# Patient Record
Sex: FEMALE | Race: WHITE | NOT HISPANIC OR LATINO | Employment: UNEMPLOYED | URBAN - METROPOLITAN AREA
[De-identification: names, ages, dates, MRNs, and addresses within clinical notes are randomized per-mention and may not be internally consistent; named-entity substitution may affect disease eponyms.]

---

## 2017-06-16 ENCOUNTER — DOCUMENTATION ONLY (OUTPATIENT)
Dept: RESEARCH | Facility: HOSPITAL | Age: 57
End: 2017-06-16

## 2017-06-16 NOTE — RESEARCH
Documentation of Informed Consent Obtained for Research by Non-[facility] Personnel    Study: PROmoting Successful Weight Loss in Primary CarE in [place] (PROP)  IRB Number: #[id number]; [facility] IRB ID: [id number]  : Mitch Bianchi, PhD.  Coordinating Site: [facility] Co-Investigators: Radha Hill MD and Andrea Carbone MD  [facility] Date: 11/6/15  This study is reviewed and acknowledged by the [facility] IRB. The IRB of Record is the [facility] ([facility]) IRB.    Is the volunteer currently enrolled in any other research trials (per Epic)? [ ] Yes  [X] No  [facility] staff administering informed consent: Rita Balderrama  Date:   5/22/2017  Does the volunteer agree to participate in the trial? [X] Yes  [ ] No  If no, document the reason here:       [X] I acknowledge that I have reviewed the informed consent form and the volunteer signed and dated the signature section of the consent forms.    Name: ([facility] personnel reviewing consent form):           Roman Miguel  Review and Scan Date (if different than date of note): 6/16/2017  Comments: See  for Consent Forms    [facility]-trained recruiters will obtain informed consent form all participants as well as HIPAA authorization.  Study protocol states all questions and concerns are clarified before any forms are signed. The following elements of the informed consent document were to be discussed:  · What you should know about a research study (e.g. purpose of the consent form; right to refuse or agree and change your mind later; participation is voluntary)?  · Who is doing the study?    · Where is the study being conducted?    · What is the purpose of this study?  · Who is eligible to participate in the study?     · What will happen to you if you take part in the study (e.g. Screening, Measurement visits, Lifestyle change meetings activities)?  · What are the  possible risks and discomforts? Benefits?  · If you do not want to take part in the study, are there other choices?  · If you have any questions or problems, whom can you call?  · What information will be kept private?  · Can your taking part in the study end early?  · What if information becomes available that might affect your decision to stay in the study?  · What charges will you have to pay? What payment will you receive?  · Will you be compensated for a study-related injury or medical illness?

## 2018-01-22 ENCOUNTER — HOSPITAL ENCOUNTER (OUTPATIENT)
Dept: RADIOLOGY | Facility: HOSPITAL | Age: 58
Discharge: HOME | End: 2018-01-22
Attending: INTERNAL MEDICINE
Payer: COMMERCIAL

## 2018-01-22 ENCOUNTER — OFFICE VISIT (OUTPATIENT)
Dept: INTERNAL MEDICINE | Facility: CLINIC | Age: 58
End: 2018-01-22
Payer: COMMERCIAL

## 2018-01-22 ENCOUNTER — IMMUNIZATION (OUTPATIENT)
Dept: INTERNAL MEDICINE | Facility: CLINIC | Age: 58
End: 2018-01-22
Payer: COMMERCIAL

## 2018-01-22 VITALS
DIASTOLIC BLOOD PRESSURE: 78 MMHG | WEIGHT: 225.06 LBS | SYSTOLIC BLOOD PRESSURE: 124 MMHG | BODY MASS INDEX: 36.17 KG/M2 | HEIGHT: 66 IN | HEART RATE: 86 BPM

## 2018-01-22 DIAGNOSIS — Z12.39 BREAST CANCER SCREENING: ICD-10-CM

## 2018-01-22 DIAGNOSIS — Z00.00 ROUTINE HISTORY AND PHYSICAL EXAMINATION OF ADULT: Primary | ICD-10-CM

## 2018-01-22 DIAGNOSIS — E66.9 OBESITY (BMI 35.0-39.9 WITHOUT COMORBIDITY): ICD-10-CM

## 2018-01-22 PROCEDURE — 77067 SCR MAMMO BI INCL CAD: CPT | Mod: TC

## 2018-01-22 PROCEDURE — 90471 IMMUNIZATION ADMIN: CPT | Mod: S$GLB,,, | Performed by: INTERNAL MEDICINE

## 2018-01-22 PROCEDURE — 90686 IIV4 VACC NO PRSV 0.5 ML IM: CPT | Mod: S$GLB,,, | Performed by: INTERNAL MEDICINE

## 2018-01-22 PROCEDURE — 99396 PREV VISIT EST AGE 40-64: CPT | Mod: 25,S$GLB,, | Performed by: INTERNAL MEDICINE

## 2018-01-22 PROCEDURE — 77067 SCR MAMMO BI INCL CAD: CPT | Mod: 26,,, | Performed by: RADIOLOGY

## 2018-01-22 PROCEDURE — 99999 PR PBB SHADOW E&M-EST. PATIENT-LVL III: CPT | Mod: PBBFAC,,, | Performed by: INTERNAL MEDICINE

## 2018-01-22 NOTE — PROGRESS NOTES
Subjective:       Patient ID: Annie Herrera is a 57 y.o. female.    Chief Complaint: Annual Exam    HPI - Feels well today.  She's not a smoker; drinks very rarely.  She's monogamous with her , but not very sexually active d/t his medical conditions.  She's due for mammogram and some bloodwork today.  Needs to lose weight - she actively asked for advice today. No illicit drug use.    PMH:  Obesity  Pre-diabetes  Elevated cholesterol, not on a statin yet     Meds:  Reviewed and reconciled in EPIC with patient during visit today.     Review of Systems   Constitutional: Negative for fever.   HENT: Negative for congestion.    Respiratory: Negative for shortness of breath.    Cardiovascular: Negative for chest pain.   Gastrointestinal: Negative for abdominal pain.   Genitourinary: Negative for difficulty urinating.   Musculoskeletal: Negative for arthralgias.   Skin: Negative for rash.   Neurological: Negative for headaches.   Psychiatric/Behavioral: Negative for sleep disturbance.       Objective:      Physical Exam   Constitutional: She is oriented to person, place, and time. She appears well-developed and well-nourished.   Friendly woman in NAD   HENT:   Head: Normocephalic and atraumatic.   Cardiovascular: Normal rate, regular rhythm and normal heart sounds.  Exam reveals no gallop and no friction rub.    No murmur heard.  Pulmonary/Chest: Effort normal and breath sounds normal. No respiratory distress. She has no wheezes. She has no rales. She exhibits no tenderness.   Neurological: She is alert and oriented to person, place, and time.   Skin: Skin is warm and dry. No erythema.   Psychiatric: She has a normal mood and affect.   Nursing note and vitals reviewed.      Assessment:       1. Routine history and physical examination of adult    2. Breast cancer screening    3. Obesity (BMI 35.0-39.9 without comorbidity)        Plan:       Annie was seen today for annual exam.    Diagnoses and all orders for this  visit:    Routine history and physical examination of adult - healthy woman.  No medical issues o/t weight.  Updating labwork  -     Comprehensive metabolic panel; Future  -     Lipid panel; Future  -     Hemoglobin A1c; Future  -     TSH; Future    Breast cancer screening  -     Mammo Digital Screening Bilat with CAD; Future    Obesity (BMI 35.0-39.9 without comorbidity) - discussed options, including weight watchers and continuing/increasing exercise.  Will refer to medical fitness for their assessment and assistance.  -     Ambulatory Referral to Medical Fitness (MEDFIT)  -     OHMEGAN SMITH ASSIGN QUESTIONNAIRE SERIES (MEDFIT)  -     [facility] Patient Entered [facility] (REGISTRAT-MAPI)    rtc one year.    LUNA Paige MD MPH  Staff Internist  '

## 2018-03-01 ENCOUNTER — CLINICAL SUPPORT (OUTPATIENT)
Dept: INTERNAL MEDICINE | Facility: CLINIC | Age: 58
End: 2018-03-01
Payer: COMMERCIAL

## 2018-03-01 VITALS — WEIGHT: 219.56 LBS | BODY MASS INDEX: 35.44 KG/M2

## 2018-03-01 NOTE — PROGRESS NOTES
Date:   3/1/18                   Time:0800  Initial Health  Contact - [x]Clinic visit  []  Phone Visit  ASSEMENT: Information obtained through combination of chart review prior to seeing patient, patient self-report.   Primary Referral diagnosis/reason for referral:   Weight loss        (See physician progress note for complete list of diagnoses.)  PCP:    Dr. Paige    Referent :  [] PCP       [] Transition Navigator    []  E.JULIETTE    []  APRN  [x]  Other:     Supports:   Daughters, husbands       Preferred Learning Style  (may be a combination)  [x]  Visual      [] Auditory     [x]  Reading    [x]  Hands-on    []  1:1    []  Group        []  Alone       []  No preference   [x]  Combination  []  Other:         Presenting issues from patients point of view:  [x]  Improve nutrition/increase healthy choices.                    [x]  Improve /maintain current functioning.  []  Obtain and maintain healthy blood pressure.                  []  Stop smoking.  [x]  Improve weight management.                                       []  Lower cholesterol.  []  Improve blood glucose management.                            []  Improve breathing.  []  Increase energy level.                                                      []  Increase/maintain independence.   []  Improve sleep.                                                                []  Decrease stress.  []  Improve pain management.                                             []  Improve mood.  []  Other:                  Pt. Education Level:     BS in accounting     Disease specific education services attended:  no        Patient Employed:  []yes    [x] No  Where _____________________  Days and Hours:                Current Self-help Behaviors  []  Checks glucose level        times a day.  [x]  Tries to modify meals so more healthy.  [x]  Exercises by   Walking 3-4 days a week for 2 miles, started weight training at [employer] 6 weeks ago     []  Takes BP         times a       (insert frequency)  []  Weighs self         (how often)  []  Takes all medications as prescribed.  [x]  Rarely misses health care appointments.  []  Sleeps 8 hours without waking more than twice.  []  Consistently wears/uses functioning aids as recommended  (ie:  Contacts [] , glasses [x] , hearing  Aid [] , prosthesis [] ,  Walker [] ,  Wheelchair []  etc.)  [x]  Regularly engages in stress management activities I.e.: Prayer  []  Quit smoking   []  Purposefully educates self about health issues.  []  Pt did bring glucose log with him/her.  []  Other  (explain)           []  Comments:       []  Reported Blood Sugar Readings:          Health screenings   Last PCP visit:   18   GYN/Pap:    Hysterectomy                         MM-22-18                                  DEXA:   16                     Colon: 13   Lipids: 18   CMP:18   HgA1C: 18             Eye Exam:    Dr. Moraima Amado         Strengths:  [x]  Confident                       [x]  Determined/persistent           [x]  Enthusiastic         [x]  Good problem solving           []  Good self-control                   [x]  Hard working        [x]  Hopeful/optimistic                  [x]  Intelligent                                [x]  Self aware  [x]  Creative                                 [x]  Flexible          []  Sense of humor                     [x]  Open/willing          [x]  Spiritual                                  [x] Values health    []  Successful overcoming difficult challenges in the past.     [x]  Pos support network  [x]   Health literate (The degree to which individuals have the capacity to obtain, process, and    understand basic health information and services needed to make appropriate health decisions.- Dept. of Health and Human services.)  []   Other Comments:             Change Markers  Scale 0-10 with 10 representing most Ready 0 least ready, 10 most important 0  least important 10 most confident 0 least confident.     [] NA at this time.   Readiness: 10    ;  Importance:    10 ;  Confidence: 10       INTERVENTIONS:  [x] Given an explanation about health coaching program and invited participation.  [x] Listened reflectively; provided support, encouragement, and validation; respected  and maintained patient self-direction; explored pros/cons of change; personalized health risks of maintaining the status quo; and facilitated recognition of discrepancy between current behaviors and patients goals and values.  [x] Assessed stage of change and employed appropriate motivational interviewing strategies to facilitate movement toward the next stage of change and healthy behavior modification.  [x] Normalized occurrence of relapse, helped patient recognize outcome of new self-learning as a result of the relapse such as triggers, and helped focus on factors to reduce chances of returning to previous behaviors .  [x] Used readiness scale ratings to guide assessment of importance and confidence of successfully reaching goals.  [x] Assisted patient to develop goal, action plan, and address potential barriers to goal     achievement.  [] Patient was given a new (insert glucose meter or other supplies), taught to use, and      successfully demonstrated ability to carry out essential steps of process.    [] Rx for ( monitor/supplies, pen needles, etc.) was obtained.  [x] Provided educational review, written materials/handouts (Meal Planning Counting Carbs, Healthy Plate, 10 Rules for Eating Safely for Life, 10 Tips to Cutting Your Cravings, Probiotics).   [x] Topics discussed/provided:    GI of foods and how it affects your metabolism and helps you burn fat, carb counting    [x] Facilitated completion of needed exams and screenings by reviewing Diabetic/Health Maintenance Report Card with patient.  [x] Provided pt with my business card.  [x] Informed of/reviewed how to access health   and after hours assistance.  [x] Discussed, planned, and scheduled future contacts.  [x]Challenges/Barriers identified discussed as identified by patient.  [x] Needs identified by this Health :    Education and support     [] Other:            For additional care management support patient was referred to:        []  Community resources for                        []  Medication assistance programs               []  Dietician              []  Diabetes  [employer]                                        []  Mental health services                           []                  []  Diabetes [place]                                              []  Home health services                                []  Complex case management              []  PCP/covering provider due to                 []  Emergency Dept.                                  []  GYN              []  Optometrist/ Ophthalmologist                          []  Podiatrist                                                      [x]  N/A        []  Other:           RESPONSE/IMPRESSION  Behavior is consistent with the following stage of change:  []  Pre-contemplation  []  Contemplation  [x]  Preparation  []  Action  []  Maintenance  []  Relapse    Level of participation:  []  Refused to participate  []  Guarded / resistant  []  Passive participant  [x]  Active participant/interactive  [x]  Interested/receptive  [x]  Self-motivated  []  Other          Goal developed by patient today:    Evaluate current meal plan and prep 10/10  Read labels to see which foods are hurting us 10/10  Examin pantry and purge foods that are not good (sugar, carbs, salt)10/10  Set meal plan prior to shopping, shop fresh and frozen 10/10  Handle meals on weekly basis to eliminate wasting foods 10/10  Move more 10/10  Eliminate soft drinks diet 10/10  Begin today ! 10/10    Plan (needed actions to accomplish goal):          [x] Pt will work on action plan as stated above.         [x] Pt will follow up with Health  on   4/4/18 at 730.     [x] Health  will remain available.  [x] Health  will maintain regular contacts with patient to educate, support, encourage; help problem-solve; assist with development of self-advocacy/increasing independent health management; and provide resources as needed.  [] Pt has declined Health  Program at this time. Provided pt with Health  Program information should they decide to participate at a later time.        [] Pt to facilitate contact with Health        [] Health  to facilitate contact with patient.        [] Other:          Notes:   Met with patient and her . She is interested in losing weight to get down to 170 lbs.   Her best reasons are to maintain health, not to lose mobility and to feel better about herself.   She has started going to [employer] about 6 weeks ago and has lost 6 lbs since then.   She is also participating in the [employer] program.  Goals set by patient and will continue to assist to reach her goals.    Best Method of Contact:  [x] email:   [x] Phone:   [] Mail  [] Office     Grisel Rainey RN

## 2021-02-24 ENCOUNTER — TELEPHONE (OUTPATIENT)
Dept: INTERNAL MEDICINE | Facility: CLINIC | Age: 61
End: 2021-02-24

## 2021-02-24 DIAGNOSIS — Z12.31 ENCOUNTER FOR SCREENING MAMMOGRAM FOR MALIGNANT NEOPLASM OF BREAST: Primary | ICD-10-CM

## 2021-03-02 ENCOUNTER — HOSPITAL ENCOUNTER (OUTPATIENT)
Dept: RADIOLOGY | Facility: HOSPITAL | Age: 61
Discharge: HOME | End: 2021-03-02
Attending: INTERNAL MEDICINE
Payer: COMMERCIAL

## 2021-03-02 ENCOUNTER — OFFICE VISIT (OUTPATIENT)
Dept: INTERNAL MEDICINE | Facility: CLINIC | Age: 61
End: 2021-03-02
Payer: COMMERCIAL

## 2021-03-02 VITALS
DIASTOLIC BLOOD PRESSURE: 70 MMHG | OXYGEN SATURATION: 98 % | WEIGHT: 230.81 LBS | HEART RATE: 82 BPM | HEIGHT: 66 IN | SYSTOLIC BLOOD PRESSURE: 130 MMHG | BODY MASS INDEX: 37.09 KG/M2

## 2021-03-02 DIAGNOSIS — Z12.31 ENCOUNTER FOR SCREENING MAMMOGRAM FOR MALIGNANT NEOPLASM OF BREAST: ICD-10-CM

## 2021-03-02 DIAGNOSIS — Z00.00 ROUTINE HISTORY AND PHYSICAL EXAMINATION OF ADULT: Primary | ICD-10-CM

## 2021-03-02 PROCEDURE — 77063 MAMMO DIGITAL SCREENING BILAT WITH TOMO: ICD-10-PCS | Mod: 26,,, | Performed by: RADIOLOGY

## 2021-03-02 PROCEDURE — 77067 MAMMO DIGITAL SCREENING BILAT WITH TOMO: ICD-10-PCS | Mod: 26,,, | Performed by: RADIOLOGY

## 2021-03-02 PROCEDURE — 99386 PREV VISIT NEW AGE 40-64: CPT | Mod: S$PBB,,, | Performed by: INTERNAL MEDICINE

## 2021-03-02 PROCEDURE — 99999 PR PBB SHADOW E&M-EST. PATIENT-LVL III: CPT | Mod: PBBFAC,,, | Performed by: INTERNAL MEDICINE

## 2021-03-02 PROCEDURE — 77063 BREAST TOMOSYNTHESIS BI: CPT | Mod: 26,,, | Performed by: RADIOLOGY

## 2021-03-02 PROCEDURE — 99386 PR PREVENTIVE VISIT,NEW,40-64: ICD-10-PCS | Mod: S$PBB,,, | Performed by: INTERNAL MEDICINE

## 2021-03-02 PROCEDURE — 77067 SCR MAMMO BI INCL CAD: CPT | Mod: TC

## 2021-03-02 PROCEDURE — 77067 SCR MAMMO BI INCL CAD: CPT | Mod: 26,,, | Performed by: RADIOLOGY

## 2021-03-02 PROCEDURE — 99999 PR PBB SHADOW E&M-EST. PATIENT-LVL III: ICD-10-PCS | Mod: PBBFAC,,, | Performed by: INTERNAL MEDICINE

## 2021-03-03 ENCOUNTER — TELEPHONE (OUTPATIENT)
Dept: RADIOLOGY | Facility: HOSPITAL | Age: 61
End: 2021-03-03

## 2021-03-04 ENCOUNTER — HOSPITAL ENCOUNTER (OUTPATIENT)
Dept: RADIOLOGY | Facility: HOSPITAL | Age: 61
Discharge: HOME | End: 2021-03-04
Attending: INTERNAL MEDICINE

## 2021-03-04 ENCOUNTER — HOSPITAL ENCOUNTER (OUTPATIENT)
Dept: RADIOLOGY | Facility: HOSPITAL | Age: 61
Discharge: HOME | End: 2021-03-04
Attending: INTERNAL MEDICINE
Payer: COMMERCIAL

## 2021-03-04 DIAGNOSIS — R92.8 ABNORMAL MAMMOGRAM: ICD-10-CM

## 2021-03-04 PROCEDURE — 77065 MAMMO DIGITAL DIAGNOSTIC LEFT WITH TOMO: ICD-10-PCS | Mod: 26,LT,, | Performed by: RADIOLOGY

## 2021-03-04 PROCEDURE — 76642 ULTRASOUND BREAST LIMITED: CPT | Mod: TC,LT

## 2021-03-04 PROCEDURE — 77065 DX MAMMO INCL CAD UNI: CPT | Mod: 26,LT,, | Performed by: RADIOLOGY

## 2021-03-04 PROCEDURE — 77061 BREAST TOMOSYNTHESIS UNI: CPT | Mod: 26,LT,, | Performed by: RADIOLOGY

## 2021-03-04 PROCEDURE — 77061 MAMMO DIGITAL DIAGNOSTIC LEFT WITH TOMO: ICD-10-PCS | Mod: 26,LT,, | Performed by: RADIOLOGY

## 2021-03-04 PROCEDURE — 77061 BREAST TOMOSYNTHESIS UNI: CPT | Mod: TC,LT

## 2021-03-04 PROCEDURE — 76642 US BREAST LEFT LIMITED: ICD-10-PCS | Mod: 26,LT,, | Performed by: RADIOLOGY

## 2021-03-04 PROCEDURE — 76642 ULTRASOUND BREAST LIMITED: CPT | Mod: 26,LT,, | Performed by: RADIOLOGY

## 2021-03-06 ENCOUNTER — IMMUNIZATION (OUTPATIENT)
Dept: PRIMARY CARE CLINIC | Facility: CLINIC | Age: 61
End: 2021-03-06
Payer: COMMERCIAL

## 2021-03-06 DIAGNOSIS — Z23 NEED FOR VACCINATION: Primary | ICD-10-CM

## 2021-03-06 PROCEDURE — 91300 PR SARS-COV- 2 COVID-19 VACCINE, NO PRSV, 30MCG/0.3ML, IM: CPT | Mod: S$GLB,,, | Performed by: EMERGENCY MEDICINE

## 2021-03-06 PROCEDURE — 0001A PR IMMUNIZ ADMIN, SARS-COV-2 COVID-19 VACC, 30MCG/0.3ML, 1ST DOSE: ICD-10-PCS | Mod: CV19,S$GLB,, | Performed by: EMERGENCY MEDICINE

## 2021-03-06 PROCEDURE — 0001A PR IMMUNIZ ADMIN, SARS-COV-2 COVID-19 VACC, 30MCG/0.3ML, 1ST DOSE: CPT | Mod: CV19,S$GLB,, | Performed by: EMERGENCY MEDICINE

## 2021-03-06 PROCEDURE — 91300 PR SARS-COV- 2 COVID-19 VACCINE, NO PRSV, 30MCG/0.3ML, IM: ICD-10-PCS | Mod: S$GLB,,, | Performed by: EMERGENCY MEDICINE

## 2021-03-06 RX ADMIN — Medication 0.3 ML: at 12:03

## 2021-03-27 ENCOUNTER — IMMUNIZATION (OUTPATIENT)
Dept: PRIMARY CARE CLINIC | Facility: CLINIC | Age: 61
End: 2021-03-27

## 2021-03-27 DIAGNOSIS — Z23 NEED FOR VACCINATION: Primary | ICD-10-CM

## 2021-03-27 PROCEDURE — 91300 PR SARS-COV- 2 COVID-19 VACCINE, NO PRSV, 30MCG/0.3ML, IM: CPT | Mod: S$GLB,,, | Performed by: INTERNAL MEDICINE

## 2021-03-27 PROCEDURE — 0002A PR IMMUNIZ ADMIN, SARS-COV-2 COVID-19 VACC, 30MCG/0.3ML, 2ND DOSE: ICD-10-PCS | Mod: CV19,S$GLB,, | Performed by: INTERNAL MEDICINE

## 2021-03-27 PROCEDURE — 0002A PR IMMUNIZ ADMIN, SARS-COV-2 COVID-19 VACC, 30MCG/0.3ML, 2ND DOSE: CPT | Mod: CV19,S$GLB,, | Performed by: INTERNAL MEDICINE

## 2021-03-27 PROCEDURE — 91300 PR SARS-COV- 2 COVID-19 VACCINE, NO PRSV, 30MCG/0.3ML, IM: ICD-10-PCS | Mod: S$GLB,,, | Performed by: INTERNAL MEDICINE

## 2021-03-27 RX ADMIN — Medication 0.3 ML: at 01:03

## 2021-09-16 ENCOUNTER — HOSPITAL ENCOUNTER (OUTPATIENT)
Dept: RADIOLOGY | Facility: HOSPITAL | Age: 61
Discharge: HOME | End: 2021-09-16
Attending: INTERNAL MEDICINE
Payer: COMMERCIAL

## 2021-09-16 DIAGNOSIS — R92.8 ABNORMAL MAMMOGRAM: ICD-10-CM

## 2021-09-16 PROCEDURE — 77061 MAMMO DIGITAL DIAGNOSTIC LEFT WITH TOMO: ICD-10-PCS | Mod: 26,LT,, | Performed by: RADIOLOGY

## 2021-09-16 PROCEDURE — 77065 MAMMO DIGITAL DIAGNOSTIC LEFT WITH TOMO: ICD-10-PCS | Mod: 26,LT,, | Performed by: RADIOLOGY

## 2021-09-16 PROCEDURE — 77061 BREAST TOMOSYNTHESIS UNI: CPT | Mod: TC,LT

## 2021-09-16 PROCEDURE — 77061 BREAST TOMOSYNTHESIS UNI: CPT | Mod: 26,LT,, | Performed by: RADIOLOGY

## 2021-09-16 PROCEDURE — 77065 DX MAMMO INCL CAD UNI: CPT | Mod: 26,LT,, | Performed by: RADIOLOGY

## 2021-10-11 ENCOUNTER — IMMUNIZATION (OUTPATIENT)
Dept: INTERNAL MEDICINE | Facility: CLINIC | Age: 61
End: 2021-10-11
Payer: COMMERCIAL

## 2021-10-11 PROCEDURE — 90471 FLU VACCINE (QUAD) GREATER THAN OR EQUAL TO 3YO PRESERVATIVE FREE IM: ICD-10-PCS | Mod: S$GLB,,, | Performed by: INTERNAL MEDICINE

## 2021-10-11 PROCEDURE — 90471 IMMUNIZATION ADMIN: CPT | Mod: S$GLB,,, | Performed by: INTERNAL MEDICINE

## 2021-10-11 PROCEDURE — 90686 FLU VACCINE (QUAD) GREATER THAN OR EQUAL TO 3YO PRESERVATIVE FREE IM: ICD-10-PCS | Mod: S$GLB,,, | Performed by: INTERNAL MEDICINE

## 2021-10-11 PROCEDURE — 90686 IIV4 VACC NO PRSV 0.5 ML IM: CPT | Mod: S$GLB,,, | Performed by: INTERNAL MEDICINE

## 2022-01-05 ENCOUNTER — LAB VISIT (OUTPATIENT)
Dept: PRIMARY CARE CLINIC | Facility: CLINIC | Age: 62
End: 2022-01-05
Payer: COMMERCIAL

## 2022-01-05 DIAGNOSIS — Z20.822 CONTACT WITH AND (SUSPECTED) EXPOSURE TO COVID-19: ICD-10-CM

## 2022-01-05 LAB
CTP QC/QA: YES
SARS-COV-2 AG RESP QL IA.RAPID: POSITIVE

## 2022-01-05 PROCEDURE — 87811 SARS-COV-2 COVID19 W/OPTIC: CPT

## 2022-01-19 ENCOUNTER — IMMUNIZATION (OUTPATIENT)
Dept: INTERNAL MEDICINE | Facility: CLINIC | Age: 62
End: 2022-01-19
Payer: COMMERCIAL

## 2022-01-19 DIAGNOSIS — Z23 NEED FOR VACCINATION: Primary | ICD-10-CM

## 2022-01-19 PROCEDURE — 0004A COVID-19, MRNA, LNP-S, PF, 30 MCG/0.3 ML DOSE VACCINE: CPT | Mod: CV19,PBBFAC | Performed by: INTERNAL MEDICINE

## 2022-03-16 ENCOUNTER — PATIENT MESSAGE (OUTPATIENT)
Dept: ADMINISTRATIVE | Facility: HOSPITAL | Age: 62
End: 2022-03-16
Payer: COMMERCIAL

## 2022-03-22 ENCOUNTER — TELEPHONE (OUTPATIENT)
Dept: INTERNAL MEDICINE | Facility: CLINIC | Age: 62
End: 2022-03-22
Payer: COMMERCIAL

## 2022-03-22 DIAGNOSIS — Z12.31 ENCOUNTER FOR SCREENING MAMMOGRAM FOR MALIGNANT NEOPLASM OF BREAST: Primary | ICD-10-CM

## 2022-03-22 NOTE — TELEPHONE ENCOUNTER
----- Message from Ximena Manning sent at 3/22/2022  2:26 PM CDT -----  Contact: [phone]  Pt needs her 6 month mammo orders put in and would like a call once orders are place so she can call and schedule.    [phone]

## 2022-04-18 ENCOUNTER — HOSPITAL ENCOUNTER (OUTPATIENT)
Dept: RADIOLOGY | Facility: HOSPITAL | Age: 62
Discharge: HOME | End: 2022-04-18
Attending: INTERNAL MEDICINE
Payer: COMMERCIAL

## 2022-04-18 VITALS — WEIGHT: 210 LBS | HEIGHT: 66 IN | BODY MASS INDEX: 33.75 KG/M2

## 2022-04-18 DIAGNOSIS — Z12.31 ENCOUNTER FOR SCREENING MAMMOGRAM FOR MALIGNANT NEOPLASM OF BREAST: ICD-10-CM

## 2022-04-18 PROCEDURE — 77063 BREAST TOMOSYNTHESIS BI: CPT | Mod: TC

## 2022-04-18 PROCEDURE — 77063 MAMMO DIGITAL SCREENING BILAT WITH TOMO: ICD-10-PCS | Mod: 26,,, | Performed by: RADIOLOGY

## 2022-04-18 PROCEDURE — 77067 MAMMO DIGITAL SCREENING BILAT WITH TOMO: ICD-10-PCS | Mod: 26,,, | Performed by: RADIOLOGY

## 2022-04-18 PROCEDURE — 77063 BREAST TOMOSYNTHESIS BI: CPT | Mod: 26,,, | Performed by: RADIOLOGY

## 2022-04-18 PROCEDURE — 77067 SCR MAMMO BI INCL CAD: CPT | Mod: 26,,, | Performed by: RADIOLOGY

## 2022-04-29 ENCOUNTER — OFFICE VISIT (OUTPATIENT)
Dept: INTERNAL MEDICINE | Facility: CLINIC | Age: 62
End: 2022-04-29
Payer: COMMERCIAL

## 2022-04-29 ENCOUNTER — LAB VISIT (OUTPATIENT)
Dept: LAB | Facility: HOSPITAL | Age: 62
End: 2022-04-29
Attending: INTERNAL MEDICINE
Payer: COMMERCIAL

## 2022-04-29 VITALS
DIASTOLIC BLOOD PRESSURE: 80 MMHG | SYSTOLIC BLOOD PRESSURE: 124 MMHG | HEIGHT: 66 IN | BODY MASS INDEX: 36.24 KG/M2 | HEART RATE: 82 BPM | WEIGHT: 225.5 LBS | OXYGEN SATURATION: 98 %

## 2022-04-29 DIAGNOSIS — Z00.00 ROUTINE HISTORY AND PHYSICAL EXAMINATION OF ADULT: Primary | ICD-10-CM

## 2022-04-29 DIAGNOSIS — Z86.19 HISTORY OF SHINGLES: ICD-10-CM

## 2022-04-29 DIAGNOSIS — Z00.00 ROUTINE HISTORY AND PHYSICAL EXAMINATION OF ADULT: ICD-10-CM

## 2022-04-29 DIAGNOSIS — R00.2 PALPITATIONS: ICD-10-CM

## 2022-04-29 LAB
ALBUMIN SERPL BCP-MCNC: 3.7 G/DL (ref 3.5–5.2)
ALP SERPL-CCNC: 77 U/L (ref 55–135)
ALT SERPL W/O P-5'-P-CCNC: 14 U/L (ref 10–44)
ANION GAP SERPL CALC-SCNC: 10 MMOL/L (ref 8–16)
AST SERPL-CCNC: 17 U/L (ref 10–40)
BILIRUB SERPL-MCNC: 0.4 MG/DL (ref 0.1–1)
BUN SERPL-MCNC: 10 MG/DL (ref 8–23)
CALCIUM SERPL-MCNC: 9.8 MG/DL (ref 8.7–10.5)
CHLORIDE SERPL-SCNC: 107 MMOL/L (ref 95–110)
CHOLEST SERPL-MCNC: 222 MG/DL (ref 120–199)
CHOLEST/HDLC SERPL: 4.5 {RATIO} (ref 2–5)
CO2 SERPL-SCNC: 25 MMOL/L (ref 23–29)
CREAT SERPL-MCNC: 0.7 MG/DL (ref 0.5–1.4)
EST. GFR  (AFRICAN AMERICAN): >60 ML/MIN/1.73 M^2
EST. GFR  (NON AFRICAN AMERICAN): >60 ML/MIN/1.73 M^2
ESTIMATED AVG GLUCOSE: 117 MG/DL (ref 68–131)
GLUCOSE SERPL-MCNC: 90 MG/DL (ref 70–110)
HBA1C MFR BLD: 5.7 % (ref 4–5.6)
HDLC SERPL-MCNC: 49 MG/DL (ref 40–75)
HDLC SERPL: 22.1 % (ref 20–50)
LDLC SERPL CALC-MCNC: 144.8 MG/DL (ref 63–159)
NONHDLC SERPL-MCNC: 173 MG/DL
POTASSIUM SERPL-SCNC: 4.1 MMOL/L (ref 3.5–5.1)
PROT SERPL-MCNC: 6.9 G/DL (ref 6–8.4)
SODIUM SERPL-SCNC: 142 MMOL/L (ref 136–145)
TRIGL SERPL-MCNC: 141 MG/DL (ref 30–150)

## 2022-04-29 PROCEDURE — 36415 COLL VENOUS BLD VENIPUNCTURE: CPT | Performed by: INTERNAL MEDICINE

## 2022-04-29 PROCEDURE — 3008F BODY MASS INDEX DOCD: CPT | Mod: CPTII,S$GLB,, | Performed by: INTERNAL MEDICINE

## 2022-04-29 PROCEDURE — 80053 COMPREHEN METABOLIC PANEL: CPT | Performed by: INTERNAL MEDICINE

## 2022-04-29 PROCEDURE — 3079F DIAST BP 80-89 MM HG: CPT | Mod: CPTII,S$GLB,, | Performed by: INTERNAL MEDICINE

## 2022-04-29 PROCEDURE — 3074F PR MOST RECENT SYSTOLIC BLOOD PRESSURE < 130 MM HG: ICD-10-PCS | Mod: CPTII,S$GLB,, | Performed by: INTERNAL MEDICINE

## 2022-04-29 PROCEDURE — 80061 LIPID PANEL: CPT | Performed by: INTERNAL MEDICINE

## 2022-04-29 PROCEDURE — 1159F PR MEDICATION LIST DOCUMENTED IN MEDICAL RECORD: ICD-10-PCS | Mod: CPTII,S$GLB,, | Performed by: INTERNAL MEDICINE

## 2022-04-29 PROCEDURE — 1160F PR REVIEW ALL MEDS BY PRESCRIBER/CLIN PHARMACIST DOCUMENTED: ICD-10-PCS | Mod: CPTII,S$GLB,, | Performed by: INTERNAL MEDICINE

## 2022-04-29 PROCEDURE — 99999 PR PBB SHADOW E&M-EST. PATIENT-LVL III: ICD-10-PCS | Mod: PBBFAC,,, | Performed by: INTERNAL MEDICINE

## 2022-04-29 PROCEDURE — 3008F PR BODY MASS INDEX (BMI) DOCUMENTED: ICD-10-PCS | Mod: CPTII,S$GLB,, | Performed by: INTERNAL MEDICINE

## 2022-04-29 PROCEDURE — 1159F MED LIST DOCD IN RCRD: CPT | Mod: CPTII,S$GLB,, | Performed by: INTERNAL MEDICINE

## 2022-04-29 PROCEDURE — 99396 PR PREVENTIVE VISIT,EST,40-64: ICD-10-PCS | Mod: S$GLB,,, | Performed by: INTERNAL MEDICINE

## 2022-04-29 PROCEDURE — 3074F SYST BP LT 130 MM HG: CPT | Mod: CPTII,S$GLB,, | Performed by: INTERNAL MEDICINE

## 2022-04-29 PROCEDURE — 1160F RVW MEDS BY RX/DR IN RCRD: CPT | Mod: CPTII,S$GLB,, | Performed by: INTERNAL MEDICINE

## 2022-04-29 PROCEDURE — 3079F PR MOST RECENT DIASTOLIC BLOOD PRESSURE 80-89 MM HG: ICD-10-PCS | Mod: CPTII,S$GLB,, | Performed by: INTERNAL MEDICINE

## 2022-04-29 PROCEDURE — 99396 PREV VISIT EST AGE 40-64: CPT | Mod: S$GLB,,, | Performed by: INTERNAL MEDICINE

## 2022-04-29 PROCEDURE — 83036 HEMOGLOBIN GLYCOSYLATED A1C: CPT | Performed by: INTERNAL MEDICINE

## 2022-04-29 PROCEDURE — 99999 PR PBB SHADOW E&M-EST. PATIENT-LVL III: CPT | Mod: PBBFAC,,, | Performed by: INTERNAL MEDICINE

## 2022-04-29 NOTE — PROGRESS NOTES
Answers for HPI/ROS submitted by the patient on 4/28/2022  activity change: No  unexpected weight change: No  neck pain: No  hearing loss: No  rhinorrhea: No  trouble swallowing: No  eye discharge: No  visual disturbance: No  chest tightness: No  wheezing: No  chest pain: No  palpitations: Yes  blood in stool: No  constipation: No  vomiting: No  diarrhea: No  polydipsia: No  polyuria: No  difficulty urinating: No  hematuria: No  menstrual problem: No  dysuria: No  joint swelling: No  arthralgias: No  headaches: No  weakness: No  confusion: No  dysphoric mood: No    Subjective:       Patient ID: Annie Herrera is a 61 y.o. female.    Chief Complaint:   Annual Exam    HPI - Ms Herrera feels well.  She's a never smoker, nondrinker.  No illicit substance use.  Not currently sexually active.  She occasionally has palpitations when she takes caffeine, so she avoids that.  She is due for tdap and shingles vaccination; has had 3 immunizations for covid-19.  She had shingles in the past.    PMH:  Obesity  Pre-diabetes  Palpitations   Elevated cholesterol, not on a statin     Meds:  Reviewed and reconciled in EPIC with patient during visit today.    Review of Systems   Constitutional: Negative for activity change and unexpected weight change.   HENT: Negative for hearing loss, rhinorrhea and trouble swallowing.    Eyes: Negative for discharge and visual disturbance.   Respiratory: Negative for chest tightness and wheezing.    Cardiovascular: Positive for palpitations. Negative for chest pain.   Gastrointestinal: Negative for blood in stool, constipation, diarrhea and vomiting.   Endocrine: Negative for polydipsia and polyuria.   Genitourinary: Negative for difficulty urinating, dysuria, hematuria and menstrual problem.   Musculoskeletal: Negative for arthralgias, joint swelling and neck pain.   Neurological: Negative for weakness and headaches.   Psychiatric/Behavioral: Negative for confusion and dysphoric mood.        Objective:      Physical Exam  Constitutional:       Appearance: She is well-developed. She is obese.   HENT:      Head: Normocephalic and atraumatic.   Cardiovascular:      Rate and Rhythm: Normal rate and regular rhythm.      Heart sounds: Normal heart sounds. No murmur heard.    No friction rub. No gallop.   Pulmonary:      Effort: Pulmonary effort is normal. No respiratory distress.      Breath sounds: Normal breath sounds. No wheezing or rales.   Chest:      Chest wall: No tenderness.   Skin:     General: Skin is warm and dry.      Findings: No erythema.   Neurological:      General: No focal deficit present.      Mental Status: She is alert.   Psychiatric:         Behavior: Behavior normal.         Assessment:       1. Routine history and physical examination of adult    2. History of shingles    3. Palpitations        Plan:       Annie was seen today for annual exam.    Diagnoses and all orders for this visit:    Routine history and physical examination of adult - doing well.  Updating lab work and immunizations.  Colonoscopy ordered  -     Comprehensive metabolic panel; Future  -     Lipid panel; Future  -     Hemoglobin A1c; Future  -     Case Request Endoscopy: screening; Future    History of shingles - still qualifies for shingrx    Palpitations - provided reassurance.  Avoid caffeine    rtc one year, sooner al Paige MD MPH  Staff Internist

## 2022-05-03 ENCOUNTER — PATIENT MESSAGE (OUTPATIENT)
Dept: PHARMACY | Facility: CLINIC | Age: 62
End: 2022-05-03
Payer: COMMERCIAL

## 2022-10-26 ENCOUNTER — IMMUNIZATION (OUTPATIENT)
Dept: INTERNAL MEDICINE | Facility: CLINIC | Age: 62
End: 2022-10-26
Payer: COMMERCIAL

## 2022-10-26 PROCEDURE — 90471 IMMUNIZATION ADMIN: CPT | Mod: S$GLB,,, | Performed by: INTERNAL MEDICINE

## 2022-10-26 PROCEDURE — 90686 IIV4 VACC NO PRSV 0.5 ML IM: CPT | Mod: S$GLB,,, | Performed by: INTERNAL MEDICINE

## 2022-10-26 PROCEDURE — 90471 FLU VACCINE (QUAD) GREATER THAN OR EQUAL TO 3YO PRESERVATIVE FREE IM: ICD-10-PCS | Mod: S$GLB,,, | Performed by: INTERNAL MEDICINE

## 2022-10-26 PROCEDURE — 90686 FLU VACCINE (QUAD) GREATER THAN OR EQUAL TO 3YO PRESERVATIVE FREE IM: ICD-10-PCS | Mod: S$GLB,,, | Performed by: INTERNAL MEDICINE

## 2022-11-04 DIAGNOSIS — Z12.11 SCREEN FOR COLON CANCER: Primary | ICD-10-CM

## 2023-02-09 ENCOUNTER — CLINICAL SUPPORT (OUTPATIENT)
Dept: ENDOSCOPY | Facility: HOSPITAL | Age: 63
End: 2023-02-09
Attending: INTERNAL MEDICINE
Payer: COMMERCIAL

## 2023-02-09 VITALS — HEIGHT: 66 IN | BODY MASS INDEX: 33.75 KG/M2 | WEIGHT: 210 LBS

## 2023-02-09 DIAGNOSIS — Z12.11 SCREEN FOR COLON CANCER: ICD-10-CM

## 2023-02-09 RX ORDER — SODIUM, POTASSIUM,MAG SULFATES 17.5-3.13G
1 SOLUTION, RECONSTITUTED, ORAL ORAL DAILY
Qty: 1 KIT | Refills: 0 | Status: SHIPPED | OUTPATIENT
Start: 2023-02-09 | End: 2023-02-11

## 2023-03-08 ENCOUNTER — ANESTHESIA EVENT (OUTPATIENT)
Dept: ENDOSCOPY | Facility: HOSPITAL | Age: 63
End: 2023-03-08
Payer: COMMERCIAL

## 2023-03-08 ENCOUNTER — HOSPITAL ENCOUNTER (OUTPATIENT)
Facility: HOSPITAL | Age: 63
Discharge: HOME | End: 2023-03-08
Attending: INTERNAL MEDICINE | Admitting: INTERNAL MEDICINE
Payer: COMMERCIAL

## 2023-03-08 ENCOUNTER — ANESTHESIA (OUTPATIENT)
Dept: ENDOSCOPY | Facility: HOSPITAL | Age: 63
End: 2023-03-08
Payer: COMMERCIAL

## 2023-03-08 VITALS
TEMPERATURE: 98 F | RESPIRATION RATE: 18 BRPM | BODY MASS INDEX: 34.99 KG/M2 | HEIGHT: 65 IN | WEIGHT: 210 LBS | DIASTOLIC BLOOD PRESSURE: 59 MMHG | HEART RATE: 69 BPM | OXYGEN SATURATION: 96 % | SYSTOLIC BLOOD PRESSURE: 140 MMHG

## 2023-03-08 DIAGNOSIS — Z12.11 COLON CANCER SCREENING: ICD-10-CM

## 2023-03-08 PROCEDURE — E9220 PRA ENDO ANESTHESIA: HCPCS | Mod: 33,,, | Performed by: NURSE ANESTHETIST, CERTIFIED REGISTERED

## 2023-03-08 PROCEDURE — 45385 PR COLONOSCOPY,REMV LESN,SNARE: ICD-10-PCS | Mod: 33,,, | Performed by: INTERNAL MEDICINE

## 2023-03-08 PROCEDURE — 25000003 PHARM REV CODE 250: Performed by: INTERNAL MEDICINE

## 2023-03-08 PROCEDURE — 27201089 HC SNARE, DISP (ANY): Performed by: INTERNAL MEDICINE

## 2023-03-08 PROCEDURE — 63600175 PHARM REV CODE 636 W HCPCS: Performed by: NURSE ANESTHETIST, CERTIFIED REGISTERED

## 2023-03-08 PROCEDURE — 88305 TISSUE EXAM BY PATHOLOGIST: CPT | Performed by: PATHOLOGY

## 2023-03-08 PROCEDURE — 45385 COLONOSCOPY W/LESION REMOVAL: CPT | Mod: 33,,, | Performed by: INTERNAL MEDICINE

## 2023-03-08 PROCEDURE — 88305 TISSUE EXAM BY PATHOLOGIST: CPT | Mod: 26,,, | Performed by: PATHOLOGY

## 2023-03-08 PROCEDURE — 37000008 HC ANESTHESIA 1ST 15 MINUTES: Performed by: INTERNAL MEDICINE

## 2023-03-08 PROCEDURE — 25000003 PHARM REV CODE 250: Performed by: NURSE ANESTHETIST, CERTIFIED REGISTERED

## 2023-03-08 PROCEDURE — E9220 PRA ENDO ANESTHESIA: ICD-10-PCS | Mod: 33,,, | Performed by: NURSE ANESTHETIST, CERTIFIED REGISTERED

## 2023-03-08 PROCEDURE — 88305 TISSUE EXAM BY PATHOLOGIST: ICD-10-PCS | Mod: 26,,, | Performed by: PATHOLOGY

## 2023-03-08 PROCEDURE — 27201012 HC FORCEPS, HOT/COLD, DISP: Performed by: INTERNAL MEDICINE

## 2023-03-08 PROCEDURE — 45385 COLONOSCOPY W/LESION REMOVAL: CPT | Mod: PT | Performed by: INTERNAL MEDICINE

## 2023-03-08 PROCEDURE — 37000009 HC ANESTHESIA EA ADD 15 MINS: Performed by: INTERNAL MEDICINE

## 2023-03-08 RX ORDER — PROPOFOL 10 MG/ML
VIAL (ML) INTRAVENOUS CONTINUOUS PRN
Status: DISCONTINUED | OUTPATIENT
Start: 2023-03-08 | End: 2023-03-08

## 2023-03-08 RX ORDER — SODIUM CHLORIDE 9 MG/ML
INJECTION, SOLUTION INTRAVENOUS CONTINUOUS
Status: DISCONTINUED | OUTPATIENT
Start: 2023-03-08 | End: 2023-03-08 | Stop reason: HOSPADM

## 2023-03-08 RX ORDER — PHENYLEPHRINE HYDROCHLORIDE 10 MG/ML
INJECTION INTRAVENOUS
Status: DISCONTINUED | OUTPATIENT
Start: 2023-03-08 | End: 2023-03-08

## 2023-03-08 RX ORDER — SODIUM CHLORIDE 9 MG/ML
INJECTION, SOLUTION INTRAVENOUS CONTINUOUS
Status: CANCELLED | OUTPATIENT
Start: 2023-03-08

## 2023-03-08 RX ORDER — PROPOFOL 10 MG/ML
VIAL (ML) INTRAVENOUS
Status: DISCONTINUED | OUTPATIENT
Start: 2023-03-08 | End: 2023-03-08

## 2023-03-08 RX ORDER — LIDOCAINE HYDROCHLORIDE 20 MG/ML
INJECTION, SOLUTION EPIDURAL; INFILTRATION; INTRACAUDAL; PERINEURAL
Status: DISCONTINUED | OUTPATIENT
Start: 2023-03-08 | End: 2023-03-08

## 2023-03-08 RX ADMIN — PROPOFOL 100 MG: 10 INJECTION, EMULSION INTRAVENOUS at 02:03

## 2023-03-08 RX ADMIN — SODIUM CHLORIDE: 0.9 INJECTION, SOLUTION INTRAVENOUS at 02:03

## 2023-03-08 RX ADMIN — PHENYLEPHRINE HYDROCHLORIDE 200 MCG: 10 INJECTION INTRAVENOUS at 02:03

## 2023-03-08 RX ADMIN — LIDOCAINE HYDROCHLORIDE 50 MG: 20 INJECTION, SOLUTION EPIDURAL; INFILTRATION; INTRACAUDAL; PERINEURAL at 02:03

## 2023-03-08 RX ADMIN — SODIUM CHLORIDE: 9 INJECTION, SOLUTION INTRAVENOUS at 02:03

## 2023-03-08 RX ADMIN — Medication 150 MCG/KG/MIN: at 02:03

## 2023-03-08 NOTE — PLAN OF CARE
Patient remains in recovery area not appearing in distress, pt on the bedside monitor and VSS. Discharge planning and Provation report sheet discussed with the patient. Family member notified and waiting in the lobby. Pt denies any complaints at this time. All pt belongings at the bedside.

## 2023-03-08 NOTE — H&P
Short Stay Endoscopy History and Physical    PCP - Helio Paige Ii, MD    Procedure - Colonoscopy  ASA - per anesthesia  Mallampati - per anesthesia  History of Anesthesia problems - no  Family history Anesthesia problems - no   Plan of anesthesia - General    HPI:  This is a 62 y.o. female here for evaluation of : asymptomatic screening exam      ROS:  Constitutional: No fevers, chills, No weight loss  CV: No chest pain  Pulm: No cough, No shortness of breath  GI: see HPI  Derm: No rash    Medical History:  has a past medical history of Ankle fracture (2016) and Hyperlipidemia.    Surgical History:  has a past surgical history that includes Hysterectomy;  section, classic; Tonsillectomy; and Oophorectomy.    Family History: family history includes Breast cancer in her paternal aunt; Cancer in her maternal grandmother and paternal uncle; Diabetes in her father and mother; Hyperlipidemia in her father and mother; Hypertension in her father and mother.. Otherwise no colon cancer, inflammatory bowel disease, or GI malignancies.    Social History:  reports that she has never smoked. She has never used smokeless tobacco. She reports that she does not drink alcohol and does not use drugs.    Review of patient's allergies indicates:   Allergen Reactions    Iodine and iodide containing products Rash       Medications:   Medications Prior to Admission   Medication Sig Dispense Refill Last Dose    calcium carbonate (OS-CARL) 500 mg calcium (1,250 mg) chewable tablet Take 1 tablet by mouth once daily.   Past Week    flaxseed 1,000 mg Cap Take by mouth.   Past Week         Physical Exam:    Vital Signs:   Vitals:    23 1359   BP: 137/78   Pulse: 92   Resp: 16   Temp: 97.9 °F (36.6 °C)       Gen: NAD, lying comfortably  HENT: NCAT, oropharynx clear  Eyes: anicteric sclerae, EOMI grossly  Neck: supple, no visible masses/goiter  Cardiac: RRR  Lungs: non-labored breathing  Abd: soft, NT/ND, normoactive  BS  Ext: no LE edema, warm, well perfused  Skin: skin intact on exposed body parts, no visible rashes, lesions  Neuro: A&Ox4, neuro exam grossly intact, moves all extremities  Psych: appropriate mood, affect        Labs:  Lab Results   Component Value Date    WBC 5.80 07/26/2013    HGB 13.9 07/26/2013    HCT 41.3 07/26/2013     07/26/2013    CHOL 222 (H) 04/29/2022    TRIG 141 04/29/2022    HDL 49 04/29/2022    ALT 14 04/29/2022    AST 17 04/29/2022     04/29/2022    K 4.1 04/29/2022     04/29/2022    CREATININE 0.7 04/29/2022    BUN 10 04/29/2022    CO2 25 04/29/2022    TSH 1.808 01/22/2018    HGBA1C 5.7 (H) 04/29/2022       Plan:  Colonoscopy for CRC screening    I have explained the risks and benefits of endoscopy procedures to the patient including but not limited to bleeding, perforation, infection, and death.  The patient was asked if they understand and allowed to ask any further questions to their satisfaction.    Juan Francisco Stallings MD

## 2023-03-08 NOTE — TRANSFER OF CARE
"Anesthesia Transfer of Care Note    Patient: Annie Herrera    Procedure(s) Performed: Procedure(s) (LRB):  COLONOSCOPY (N/A)    Patient location: PACU    Anesthesia Type: general    Transport from OR: Transported from OR on room air with adequate spontaneous ventilation    Post pain: adequate analgesia    Post assessment: no apparent anesthetic complications    Post vital signs: stable    Level of consciousness: awake    Nausea/Vomiting: no nausea/vomiting    Complications: none    Transfer of care protocol was followed      Last vitals:   Visit Vitals  /78 (Patient Position: Lying)   Pulse 92   Temp 36.6 °C (97.9 °F) (Temporal)   Resp 16   Ht 5' 5" (1.651 m)   Wt 95.3 kg (210 lb)   SpO2 97%   Breastfeeding No   BMI 34.95 kg/m²     "

## 2023-03-08 NOTE — ANESTHESIA POSTPROCEDURE EVALUATION
Anesthesia Post Evaluation    Patient: Annie Herrera    Procedure(s) Performed: Procedure(s) (LRB):  COLONOSCOPY (N/A)    Final Anesthesia Type: general      Patient location during evaluation: PACU  Patient participation: Yes- Able to Participate  Level of consciousness: awake and alert and oriented  Post-procedure vital signs: reviewed and stable  Pain management: adequate  Airway patency: patent    PONV status at discharge: No PONV  Anesthetic complications: no      Cardiovascular status: stable  Respiratory status: unassisted, spontaneous ventilation and room air  Hydration status: euvolemic  Follow-up not needed.          Vitals Value Taken Time   /66 03/08/23 1530   Temp 36.7 °C (98.1 °F) 03/08/23 1515   Pulse 74 03/08/23 1530   Resp 18 03/08/23 1530   SpO2 99 % 03/08/23 1530         No case tracking events are documented in the log.      Pain/Ritchie Score: Ritchie Score: 10 (3/8/2023  3:19 PM)

## 2023-03-08 NOTE — ANESTHESIA PREPROCEDURE EVALUATION
2023  Annie Herrera is a 62 y.o., female.    Procedure: COLONOSCOPY (Abdomen) - instructions sent to [facility]   Precalled 3/2/23. Pt verbalized understanding of procedure instructions Nakul   Anesthesia type: Choice   Diagnosis: Screen for colon cancer [Z12.11]         Pre-operative evaluation for Procedure(s) (LRB):  COLONOSCOPY (N/A)    @[id number]@@    No diagnosis found.    Review of patient's allergies indicates:   Allergen Reactions    Iodine and iodide containing products Rash       No medications prior to admission.            No current facility-administered medications on file prior to encounter.     Current Outpatient Medications on File Prior to Encounter   Medication Sig Dispense Refill    calcium carbonate (OS-CARL) 500 mg calcium (1,250 mg) chewable tablet Take 1 tablet by mouth once daily.      flaxseed 1,000 mg Cap Take by mouth.         Past Medical History:  2016: Ankle fracture  No date: Hyperlipidemia    Past Surgical History:   Procedure Laterality Date     SECTION, CLASSIC      HYSTERECTOMY      OOPHORECTOMY      TONSILLECTOMY         Social History     Tobacco Use   Smoking Status Never   Smokeless Tobacco Never       Social History     Substance and Sexual Activity   Alcohol Use No       Physical Activity: Not on file         No results for input(s): HCT in the last 72 hours.  No results for input(s): PLT in the last 72 hours.  No results for input(s): K in the last 72 hours.  No results for input(s): CREATININE in the last 72 hours.  No results for input(s): GLU in the last 72 hours.  No results for input(s): PT in the last 72 hours.                    Pre-op Assessment          Review of Systems  Anesthesia Hx:  No problems with previous Anesthesia    Hematology/Oncology:  Hematology Normal   Oncology Normal      Cardiovascular:  Cardiovascular Normal  Denies Hypertension.  Denies MI.    Denies Angina.    Pulmonary:  Pulmonary Normal  Denies COPD.  Denies Asthma.  Denies Shortness of breath.  Denies Sleep Apnea.    Renal/:   Denies Chronic Renal Disease.     Hepatic/GI:   Denies Liver Disease.    Neurological:   Denies TIA. Denies CVA. Denies Seizures.    Endocrine:   Denies Diabetes.        Physical Exam  General: Well nourished, Cooperative, Alert and Oriented    Airway:  Mallampati: III   Mouth Opening: Normal  TM Distance: Normal  Tongue: Normal  Neck ROM: Normal ROM        Anesthesia Plan  Type of Anesthesia, risks & benefits discussed:    Anesthesia Type: Gen Natural Airway  Intra-op Monitoring Plan: Standard ASA Monitors  Post Op Pain Control Plan: IV/PO Opioids PRN  Induction:  IV  Informed Consent: Informed consent signed with the Patient and all parties understand the risks and agree with anesthesia plan.  All questions answered.   ASA Score: 2  Day of Surgery Review of History & Physical: H&P Update referred to the surgeon/provider.    Ready For Surgery From Anesthesia Perspective.     .

## 2023-03-08 NOTE — PROVATION PATIENT INSTRUCTIONS
Discharge Summary/Instructions after an Endoscopic Procedure  Patient Name: Annie Herrera  Patient MRN: [id number]  Patient YOB: 1960 Wednesday, March 8, 2023  Laci Shaver MD  Dear patient,  As a result of recent federal legislation (The Federal Cures Act), you may   receive lab or pathology results from your procedure in your [facility]   account before your physician is able to contact you. Your physician or   their representative will relay the results to you with their   recommendations at their soonest availability.  Thank you,  RESTRICTIONS:  During your procedure today, you received medications for sedation.  These   medications may affect your judgment, balance and coordination.  Therefore,   for 24 hours, you have the following restrictions:   - DO NOT drive a car, operate machinery, make legal/financial decisions,   sign important papers or drink alcohol.    ACTIVITY:  Today: no heavy lifting, straining or running due to procedural   sedation/anesthesia.  The following day: return to full activity including work.  DIET:  Eat and drink normally unless instructed otherwise.     TREATMENT FOR COMMON SIDE EFFECTS:  - Mild abdominal pain, nausea, belching, bloating or excessive gas:  rest,   eat lightly and use a heating pad.  - Sore Throat: treat with throat lozenges and/or gargle with warm salt   water.  - Because air was used during the procedure, expelling large amounts of air   from your rectum or belching is normal.  - If a bowel prep was taken, you may not have a bowel movement for 1-3 days.    This is normal.  SYMPTOMS TO WATCH FOR AND REPORT TO YOUR PHYSICIAN:  1. Abdominal pain or bloating, other than gas cramps.  2. Chest pain.  3. Back pain.  4. Signs of infection such as: chills or fever occurring within 24 hours   after the procedure.  5. Rectal bleeding, which would show as bright red, maroon, or black stools.   (A tablespoon of blood from the rectum is not serious, especially if    hemorrhoids are present.)  6. Vomiting.  7. Weakness or dizziness.  GO DIRECTLY TO THE NEAREST EMERGENCY ROOM IF YOU HAVE ANY OF THE FOLLOWING:      Difficulty breathing              Chills and/or fever over 101 F   Persistent vomiting and/or vomiting blood   Severe abdominal pain   Severe chest pain   Black, tarry stools   Bleeding- more than one tablespoon   Any other symptom or condition that you feel may need urgent attention  Your doctor recommends these additional instructions:  If any biopsies were taken, your doctors clinic will contact you in 1 to 2   weeks with any results.  - Discharge patient to home (ambulatory).   - Patient has a contact number available for emergencies.  The signs and   symptoms of potential delayed complications were discussed with the   patient.  Return to normal activities tomorrow.  Written discharge   instructions were provided to the patient.   - Resume previous diet.   - Continue present medications.   - Return to primary care physician as previously scheduled.   - Repeat colonoscopy in 7 years for surveillance.  For questions, problems or results please call your physician - Laci Shaver MD at Work:  [phone].  [facility] [place], EMERGENCY ROOM PHONE NUMBER: [phone]  IF A COMPLICATION OR EMERGENCY SITUATION ARISES AND YOU ARE UNABLE TO REACH   YOUR PHYSICIAN - GO DIRECTLY TO THE EMERGENCY ROOM.  Laci Shaver MD  3/8/2023 3:11:50 PM  This report has been verified and signed electronically.  Dear patient,  As a result of recent federal legislation (The Federal Cures Act), you may   receive lab or pathology results from your procedure in your [facility]   account before your physician is able to contact you. Your physician or   their representative will relay the results to you with their   recommendations at their soonest availability.  Thank you,  PROVATION

## 2023-03-14 ENCOUNTER — PATIENT MESSAGE (OUTPATIENT)
Dept: GASTROENTEROLOGY | Facility: HOSPITAL | Age: 63
End: 2023-03-14
Payer: COMMERCIAL

## 2023-03-14 LAB
FINAL PATHOLOGIC DIAGNOSIS: NORMAL
Lab: NORMAL

## 2023-05-04 DIAGNOSIS — Z12.31 OTHER SCREENING MAMMOGRAM: ICD-10-CM

## 2023-05-08 ENCOUNTER — HOSPITAL ENCOUNTER (OUTPATIENT)
Dept: RADIOLOGY | Facility: HOSPITAL | Age: 63
Discharge: HOME | End: 2023-05-08
Attending: INTERNAL MEDICINE
Payer: COMMERCIAL

## 2023-05-08 ENCOUNTER — LAB VISIT (OUTPATIENT)
Dept: LAB | Facility: HOSPITAL | Age: 63
End: 2023-05-08
Attending: INTERNAL MEDICINE
Payer: COMMERCIAL

## 2023-05-08 ENCOUNTER — OFFICE VISIT (OUTPATIENT)
Dept: INTERNAL MEDICINE | Facility: CLINIC | Age: 63
End: 2023-05-08
Payer: COMMERCIAL

## 2023-05-08 ENCOUNTER — IMMUNIZATION (OUTPATIENT)
Dept: INTERNAL MEDICINE | Facility: CLINIC | Age: 63
End: 2023-05-08
Payer: COMMERCIAL

## 2023-05-08 VITALS — HEIGHT: 65 IN | BODY MASS INDEX: 36.82 KG/M2 | WEIGHT: 221 LBS

## 2023-05-08 VITALS
HEART RATE: 104 BPM | OXYGEN SATURATION: 96 % | DIASTOLIC BLOOD PRESSURE: 86 MMHG | BODY MASS INDEX: 36.84 KG/M2 | HEIGHT: 65 IN | WEIGHT: 221.13 LBS | SYSTOLIC BLOOD PRESSURE: 100 MMHG

## 2023-05-08 DIAGNOSIS — Z12.31 OTHER SCREENING MAMMOGRAM: ICD-10-CM

## 2023-05-08 DIAGNOSIS — Z00.00 ROUTINE HISTORY AND PHYSICAL EXAMINATION OF ADULT: Primary | ICD-10-CM

## 2023-05-08 DIAGNOSIS — Z00.00 ROUTINE HISTORY AND PHYSICAL EXAMINATION OF ADULT: ICD-10-CM

## 2023-05-08 DIAGNOSIS — E66.9 CLASS 2 OBESITY: ICD-10-CM

## 2023-05-08 DIAGNOSIS — Z23 NEED FOR VACCINATION: Primary | ICD-10-CM

## 2023-05-08 DIAGNOSIS — J06.9 UPPER RESPIRATORY TRACT INFECTION, UNSPECIFIED TYPE: ICD-10-CM

## 2023-05-08 DIAGNOSIS — B00.1 FEVER BLISTER: ICD-10-CM

## 2023-05-08 LAB
ALBUMIN SERPL BCP-MCNC: 3.7 G/DL (ref 3.5–5.2)
ALP SERPL-CCNC: 84 U/L (ref 55–135)
ALT SERPL W/O P-5'-P-CCNC: 17 U/L (ref 10–44)
ANION GAP SERPL CALC-SCNC: 8 MMOL/L (ref 8–16)
AST SERPL-CCNC: 18 U/L (ref 10–40)
BILIRUB SERPL-MCNC: 0.4 MG/DL (ref 0.1–1)
BUN SERPL-MCNC: 10 MG/DL (ref 8–23)
CALCIUM SERPL-MCNC: 9.6 MG/DL (ref 8.7–10.5)
CHLORIDE SERPL-SCNC: 106 MMOL/L (ref 95–110)
CHOLEST SERPL-MCNC: 205 MG/DL (ref 120–199)
CHOLEST/HDLC SERPL: 4.3 {RATIO} (ref 2–5)
CO2 SERPL-SCNC: 29 MMOL/L (ref 23–29)
CREAT SERPL-MCNC: 0.8 MG/DL (ref 0.5–1.4)
EST. GFR  (NO RACE VARIABLE): >60 ML/MIN/1.73 M^2
ESTIMATED AVG GLUCOSE: 108 MG/DL (ref 68–131)
GLUCOSE SERPL-MCNC: 85 MG/DL (ref 70–110)
HBA1C MFR BLD: 5.4 % (ref 4–5.6)
HDLC SERPL-MCNC: 48 MG/DL (ref 40–75)
HDLC SERPL: 23.4 % (ref 20–50)
LDLC SERPL CALC-MCNC: 130.6 MG/DL (ref 63–159)
NONHDLC SERPL-MCNC: 157 MG/DL
POTASSIUM SERPL-SCNC: 4.2 MMOL/L (ref 3.5–5.1)
PROT SERPL-MCNC: 7.1 G/DL (ref 6–8.4)
SODIUM SERPL-SCNC: 143 MMOL/L (ref 136–145)
TRIGL SERPL-MCNC: 132 MG/DL (ref 30–150)

## 2023-05-08 PROCEDURE — 3079F PR MOST RECENT DIASTOLIC BLOOD PRESSURE 80-89 MM HG: ICD-10-PCS | Mod: CPTII,S$GLB,, | Performed by: INTERNAL MEDICINE

## 2023-05-08 PROCEDURE — 91312 COVID-19, MRNA, LNP-S, BIVALENT BOOSTER, PF, 30 MCG/0.3 ML DOSE: CPT | Mod: S$GLB,,, | Performed by: INTERNAL MEDICINE

## 2023-05-08 PROCEDURE — 77067 SCR MAMMO BI INCL CAD: CPT | Mod: TC

## 2023-05-08 PROCEDURE — 99999 PR PBB SHADOW E&M-EST. PATIENT-LVL IV: CPT | Mod: PBBFAC,,, | Performed by: INTERNAL MEDICINE

## 2023-05-08 PROCEDURE — 3008F BODY MASS INDEX DOCD: CPT | Mod: CPTII,S$GLB,, | Performed by: INTERNAL MEDICINE

## 2023-05-08 PROCEDURE — 3008F PR BODY MASS INDEX (BMI) DOCUMENTED: ICD-10-PCS | Mod: CPTII,S$GLB,, | Performed by: INTERNAL MEDICINE

## 2023-05-08 PROCEDURE — 3074F SYST BP LT 130 MM HG: CPT | Mod: CPTII,S$GLB,, | Performed by: INTERNAL MEDICINE

## 2023-05-08 PROCEDURE — 99999 PR PBB SHADOW E&M-EST. PATIENT-LVL IV: ICD-10-PCS | Mod: PBBFAC,,, | Performed by: INTERNAL MEDICINE

## 2023-05-08 PROCEDURE — 77067 MAMMO DIGITAL SCREENING BILAT WITH TOMO: ICD-10-PCS | Mod: 26,,, | Performed by: RADIOLOGY

## 2023-05-08 PROCEDURE — 91312 COVID-19, MRNA, LNP-S, BIVALENT BOOSTER, PF, 30 MCG/0.3 ML DOSE: ICD-10-PCS | Mod: S$GLB,,, | Performed by: INTERNAL MEDICINE

## 2023-05-08 PROCEDURE — 99396 PR PREVENTIVE VISIT,EST,40-64: ICD-10-PCS | Mod: S$GLB,,, | Performed by: INTERNAL MEDICINE

## 2023-05-08 PROCEDURE — 99396 PREV VISIT EST AGE 40-64: CPT | Mod: S$GLB,,, | Performed by: INTERNAL MEDICINE

## 2023-05-08 PROCEDURE — 83036 HEMOGLOBIN GLYCOSYLATED A1C: CPT | Performed by: INTERNAL MEDICINE

## 2023-05-08 PROCEDURE — 77067 SCR MAMMO BI INCL CAD: CPT | Mod: 26,,, | Performed by: RADIOLOGY

## 2023-05-08 PROCEDURE — 1160F RVW MEDS BY RX/DR IN RCRD: CPT | Mod: CPTII,S$GLB,, | Performed by: INTERNAL MEDICINE

## 2023-05-08 PROCEDURE — 77063 BREAST TOMOSYNTHESIS BI: CPT | Mod: 26,,, | Performed by: RADIOLOGY

## 2023-05-08 PROCEDURE — 3074F PR MOST RECENT SYSTOLIC BLOOD PRESSURE < 130 MM HG: ICD-10-PCS | Mod: CPTII,S$GLB,, | Performed by: INTERNAL MEDICINE

## 2023-05-08 PROCEDURE — 36415 COLL VENOUS BLD VENIPUNCTURE: CPT | Performed by: INTERNAL MEDICINE

## 2023-05-08 PROCEDURE — 1159F PR MEDICATION LIST DOCUMENTED IN MEDICAL RECORD: ICD-10-PCS | Mod: CPTII,S$GLB,, | Performed by: INTERNAL MEDICINE

## 2023-05-08 PROCEDURE — 80061 LIPID PANEL: CPT | Performed by: INTERNAL MEDICINE

## 2023-05-08 PROCEDURE — 1160F PR REVIEW ALL MEDS BY PRESCRIBER/CLIN PHARMACIST DOCUMENTED: ICD-10-PCS | Mod: CPTII,S$GLB,, | Performed by: INTERNAL MEDICINE

## 2023-05-08 PROCEDURE — 80053 COMPREHEN METABOLIC PANEL: CPT | Performed by: INTERNAL MEDICINE

## 2023-05-08 PROCEDURE — 77063 MAMMO DIGITAL SCREENING BILAT WITH TOMO: ICD-10-PCS | Mod: 26,,, | Performed by: RADIOLOGY

## 2023-05-08 PROCEDURE — 0124A COVID-19, MRNA, LNP-S, BIVALENT BOOSTER, PF, 30 MCG/0.3 ML DOSE: CPT | Mod: CV19,PBBFAC | Performed by: INTERNAL MEDICINE

## 2023-05-08 PROCEDURE — 3079F DIAST BP 80-89 MM HG: CPT | Mod: CPTII,S$GLB,, | Performed by: INTERNAL MEDICINE

## 2023-05-08 PROCEDURE — 1159F MED LIST DOCD IN RCRD: CPT | Mod: CPTII,S$GLB,, | Performed by: INTERNAL MEDICINE

## 2023-05-08 NOTE — PROGRESS NOTES
Subjective:       Patient ID: Annie Herrera is a 62 y.o. female.    Chief Complaint:   Annual Exam    HPI - she feels well today.  Just back from a 9 day cruise in [place] with her .  She has a mild cold and is recovering from a fever blister on her lip today.  She is a never smoker and minimal drinker.  Sexually active with her  only.  No illicit substance use.  Would like to see bariatric medicine    PMH:  Obesity  Pre-diabetes  Palpitations   Elevated cholesterol, not on a statin     Meds:  Reviewed and reconciled in EPIC with patient during visit today.     Review of Systems   Constitutional:  Negative for fever.   HENT:  Positive for congestion.    Respiratory:  Negative for cough.    Cardiovascular:  Negative for chest pain.   Gastrointestinal:  Negative for abdominal pain.   Genitourinary:  Negative for difficulty urinating.   Musculoskeletal:  Negative for arthralgias.   Skin:  Positive for rash.   Neurological:  Negative for headaches.   Psychiatric/Behavioral:  Negative for sleep disturbance.      Objective:      Physical Exam  Vitals reviewed.   Constitutional:       Appearance: She is well-developed.   HENT:      Head: Normocephalic and atraumatic.   Cardiovascular:      Rate and Rhythm: Normal rate and regular rhythm.      Heart sounds: Normal heart sounds. No murmur heard.    No friction rub. No gallop.   Pulmonary:      Effort: Pulmonary effort is normal. No respiratory distress.      Breath sounds: Normal breath sounds. No wheezing or rales.   Chest:      Chest wall: No tenderness.   Skin:     General: Skin is warm and dry.      Findings: No erythema.   Neurological:      General: No focal deficit present.      Mental Status: She is alert.   Psychiatric:         Mood and Affect: Mood normal.       Assessment:       1. Routine history and physical examination of adult    2. Upper respiratory tract infection, unspecified type    3. Fever blister    4. Class 2 obesity        Plan:        Annie was seen today for annual exam.    Diagnoses and all orders for this visit:    Routine history and physical examination of adult - she is doing well.  Updating labs as recommended for someone her age.  -     Comprehensive metabolic panel; Future  -     Lipid panel; Future  -     Hemoglobin A1c; Future    Upper respiratory tract infection, unspecified type - continue conservative therapy.    Fever blister - healing.  Continue conservative therapy.    Class 2 obesity - chronic.  Asking Bariatric Medicine to advise.  -     Ambulatory referral/consult to Bariatric Medicine; Future    Rtc prn, or in a year    LUNA Paige MD MPH  Staff Internist

## 2023-05-09 ENCOUNTER — TELEPHONE (OUTPATIENT)
Dept: BARIATRICS | Facility: CLINIC | Age: 63
End: 2023-05-09
Payer: COMMERCIAL

## 2023-05-11 ENCOUNTER — TELEPHONE (OUTPATIENT)
Dept: BARIATRICS | Facility: CLINIC | Age: 63
End: 2023-05-11
Payer: COMMERCIAL

## 2023-05-11 NOTE — TELEPHONE ENCOUNTER
Notified patient of the date & time of financial phone call appt.  Pt aware appt is a telephone call.    Dashboard updated  Appt. 05.16.2023

## 2023-05-12 ENCOUNTER — TELEPHONE (OUTPATIENT)
Dept: BARIATRICS | Facility: CLINIC | Age: 63
End: 2023-05-12
Payer: COMMERCIAL

## 2024-02-21 ENCOUNTER — OFFICE VISIT (OUTPATIENT)
Dept: INTERNAL MEDICINE | Facility: CLINIC | Age: 64
End: 2024-02-21
Payer: COMMERCIAL

## 2024-02-21 VITALS
BODY MASS INDEX: 37.47 KG/M2 | HEIGHT: 65 IN | OXYGEN SATURATION: 97 % | HEART RATE: 76 BPM | DIASTOLIC BLOOD PRESSURE: 96 MMHG | WEIGHT: 224.88 LBS | SYSTOLIC BLOOD PRESSURE: 130 MMHG

## 2024-02-21 DIAGNOSIS — B02.8 HERPES ZOSTER WITH COMPLICATION: Primary | ICD-10-CM

## 2024-02-21 PROCEDURE — 3080F DIAST BP >= 90 MM HG: CPT | Mod: CPTII,S$GLB,, | Performed by: NURSE PRACTITIONER

## 2024-02-21 PROCEDURE — 99213 OFFICE O/P EST LOW 20 MIN: CPT | Mod: S$GLB,,, | Performed by: NURSE PRACTITIONER

## 2024-02-21 PROCEDURE — 3075F SYST BP GE 130 - 139MM HG: CPT | Mod: CPTII,S$GLB,, | Performed by: NURSE PRACTITIONER

## 2024-02-21 PROCEDURE — 99999 PR PBB SHADOW E&M-EST. PATIENT-LVL III: CPT | Mod: PBBFAC,,, | Performed by: NURSE PRACTITIONER

## 2024-02-21 PROCEDURE — 3008F BODY MASS INDEX DOCD: CPT | Mod: CPTII,S$GLB,, | Performed by: NURSE PRACTITIONER

## 2024-02-21 PROCEDURE — 1159F MED LIST DOCD IN RCRD: CPT | Mod: CPTII,S$GLB,, | Performed by: NURSE PRACTITIONER

## 2024-02-21 RX ORDER — IBUPROFEN 200 MG
200 TABLET ORAL EVERY 6 HOURS PRN
COMMUNITY

## 2024-02-21 RX ORDER — VALACYCLOVIR HYDROCHLORIDE 1 G/1
1000 TABLET, FILM COATED ORAL 3 TIMES DAILY
Qty: 21 TABLET | Refills: 0 | Status: SHIPPED | OUTPATIENT
Start: 2024-02-21 | End: 2024-05-17

## 2024-02-21 RX ORDER — GABAPENTIN 300 MG/1
300 CAPSULE ORAL 3 TIMES DAILY PRN
Qty: 60 CAPSULE | Refills: 0 | Status: SHIPPED | OUTPATIENT
Start: 2024-02-21 | End: 2024-04-02 | Stop reason: SDUPTHER

## 2024-02-21 RX ORDER — GABAPENTIN 300 MG/1
300 CAPSULE ORAL 3 TIMES DAILY PRN
Qty: 60 CAPSULE | Refills: 0 | Status: SHIPPED | OUTPATIENT
Start: 2024-02-21 | End: 2024-02-21

## 2024-02-21 RX ORDER — METHOCARBAMOL 500 MG/1
500 TABLET, FILM COATED ORAL 3 TIMES DAILY PRN
Qty: 30 TABLET | Refills: 0 | Status: CANCELLED | OUTPATIENT
Start: 2024-02-21

## 2024-02-21 RX ORDER — NAPROXEN 500 MG/1
500 TABLET ORAL 2 TIMES DAILY PRN
Qty: 30 TABLET | Refills: 0 | Status: CANCELLED | OUTPATIENT
Start: 2024-02-21

## 2024-02-21 NOTE — PROGRESS NOTES
Subjective     Patient ID: Annie Herrera is a 63 y.o. female.    Chief Complaint: pulled muscle    Presenting for pain to L upper extremity that is tingling and itching in nature. Pain also to upper left back that is tingling and burning. Pain in arm and back started Monday, it now hurts so much that even her shirt causes it to hurt. She has been taking ibuprofen with no relief. Had shingles about 20 years ago to L temporal area. Received vaccine. Also having flashes and spider webs to R eye; eye doc did an exam and did not see any detachment. Was told to monitor symptoms.     Review of Systems   Constitutional:  Negative for activity change and unexpected weight change.   HENT:  Negative for hearing loss, rhinorrhea and trouble swallowing.    Eyes:  Positive for visual disturbance. Negative for discharge.   Respiratory:  Negative for chest tightness and wheezing.    Cardiovascular:  Negative for chest pain and palpitations.   Gastrointestinal:  Negative for blood in stool, constipation, diarrhea and vomiting.   Endocrine: Negative for polydipsia and polyuria.   Genitourinary:  Negative for difficulty urinating, dysuria, hematuria and menstrual problem.   Musculoskeletal:  Positive for arthralgias and joint swelling. Negative for neck pain.   Neurological:  Negative for weakness and headaches.   Psychiatric/Behavioral:  Negative for confusion and dysphoric mood.           Objective     Physical Exam  Constitutional:       Appearance: Normal appearance.   HENT:      Head: Normocephalic and atraumatic.      Nose: Nose normal.      Mouth/Throat:      Mouth: Mucous membranes are moist.      Pharynx: Oropharynx is clear.   Eyes:      Conjunctiva/sclera: Conjunctivae normal.   Cardiovascular:      Rate and Rhythm: Normal rate and regular rhythm.   Pulmonary:      Effort: Pulmonary effort is normal.   Abdominal:      General: Abdomen is flat.   Skin:     General: Skin is warm and dry.      Findings: Rash (very slight  rash to lateral L elbow, consistent with shingles) present.   Neurological:      General: No focal deficit present.      Mental Status: She is alert and oriented to person, place, and time.   Psychiatric:         Mood and Affect: Mood normal.         Behavior: Behavior normal.        Assessment and Plan     1. Herpes zoster with complication; presentation consistent with herpes zoster, pain/rash does not cross midline, will treat   -     valACYclovir (VALTREX) 1000 MG tablet; Take 1 tablet (1,000 mg total) by mouth 3 (three) times daily. for 7 days  -     gabapentin (NEURONTIN) 300 MG capsule; Take 1 capsule (300 mg total) by mouth 3 (three) times daily as needed (nerve pain).        -     Otc capsicin cream as need for pain. May also continue ibuprofen, with food           Wili Vega NP   Internal Medicine            Follow up if symptoms worsen or fail to improve.

## 2024-04-02 DIAGNOSIS — B02.8 HERPES ZOSTER WITH COMPLICATION: ICD-10-CM

## 2024-04-02 RX ORDER — GABAPENTIN 300 MG/1
300 CAPSULE ORAL 3 TIMES DAILY PRN
Qty: 180 CAPSULE | Refills: 3 | Status: SHIPPED | OUTPATIENT
Start: 2024-04-02

## 2024-05-17 ENCOUNTER — OFFICE VISIT (OUTPATIENT)
Dept: INTERNAL MEDICINE | Facility: CLINIC | Age: 64
End: 2024-05-17
Payer: COMMERCIAL

## 2024-05-17 ENCOUNTER — LAB VISIT (OUTPATIENT)
Dept: LAB | Facility: HOSPITAL | Age: 64
End: 2024-05-17
Attending: INTERNAL MEDICINE
Payer: COMMERCIAL

## 2024-05-17 VITALS
DIASTOLIC BLOOD PRESSURE: 88 MMHG | SYSTOLIC BLOOD PRESSURE: 132 MMHG | HEIGHT: 66 IN | BODY MASS INDEX: 37.03 KG/M2 | HEART RATE: 72 BPM | WEIGHT: 230.38 LBS

## 2024-05-17 DIAGNOSIS — Z12.31 ENCOUNTER FOR SCREENING MAMMOGRAM FOR MALIGNANT NEOPLASM OF BREAST: ICD-10-CM

## 2024-05-17 DIAGNOSIS — Z86.19 HISTORY OF SHINGLES: ICD-10-CM

## 2024-05-17 DIAGNOSIS — Z00.00 ROUTINE HISTORY AND PHYSICAL EXAMINATION OF ADULT: Primary | ICD-10-CM

## 2024-05-17 DIAGNOSIS — Z00.00 ROUTINE HISTORY AND PHYSICAL EXAMINATION OF ADULT: ICD-10-CM

## 2024-05-17 LAB
ALBUMIN SERPL BCP-MCNC: 3.7 G/DL (ref 3.5–5.2)
ALP SERPL-CCNC: 72 U/L (ref 55–135)
ALT SERPL W/O P-5'-P-CCNC: 16 U/L (ref 10–44)
ANION GAP SERPL CALC-SCNC: 8 MMOL/L (ref 8–16)
AST SERPL-CCNC: 17 U/L (ref 10–40)
BILIRUB SERPL-MCNC: 0.4 MG/DL (ref 0.1–1)
BUN SERPL-MCNC: 14 MG/DL (ref 8–23)
CALCIUM SERPL-MCNC: 9.6 MG/DL (ref 8.7–10.5)
CHLORIDE SERPL-SCNC: 108 MMOL/L (ref 95–110)
CHOLEST SERPL-MCNC: 241 MG/DL (ref 120–199)
CHOLEST/HDLC SERPL: 4.5 {RATIO} (ref 2–5)
CO2 SERPL-SCNC: 25 MMOL/L (ref 23–29)
CREAT SERPL-MCNC: 0.7 MG/DL (ref 0.5–1.4)
EST. GFR  (NO RACE VARIABLE): >60 ML/MIN/1.73 M^2
GLUCOSE SERPL-MCNC: 91 MG/DL (ref 70–110)
HDLC SERPL-MCNC: 54 MG/DL (ref 40–75)
HDLC SERPL: 22.4 % (ref 20–50)
LDLC SERPL CALC-MCNC: 163 MG/DL (ref 63–159)
NONHDLC SERPL-MCNC: 187 MG/DL
POTASSIUM SERPL-SCNC: 4.6 MMOL/L (ref 3.5–5.1)
PROT SERPL-MCNC: 6.9 G/DL (ref 6–8.4)
SODIUM SERPL-SCNC: 141 MMOL/L (ref 136–145)
TRIGL SERPL-MCNC: 120 MG/DL (ref 30–150)

## 2024-05-17 PROCEDURE — 36415 COLL VENOUS BLD VENIPUNCTURE: CPT | Performed by: INTERNAL MEDICINE

## 2024-05-17 PROCEDURE — 3079F DIAST BP 80-89 MM HG: CPT | Mod: CPTII,S$GLB,, | Performed by: INTERNAL MEDICINE

## 2024-05-17 PROCEDURE — 1159F MED LIST DOCD IN RCRD: CPT | Mod: CPTII,S$GLB,, | Performed by: INTERNAL MEDICINE

## 2024-05-17 PROCEDURE — 99396 PREV VISIT EST AGE 40-64: CPT | Mod: S$GLB,,, | Performed by: INTERNAL MEDICINE

## 2024-05-17 PROCEDURE — 3075F SYST BP GE 130 - 139MM HG: CPT | Mod: CPTII,S$GLB,, | Performed by: INTERNAL MEDICINE

## 2024-05-17 PROCEDURE — 80053 COMPREHEN METABOLIC PANEL: CPT | Performed by: INTERNAL MEDICINE

## 2024-05-17 PROCEDURE — 1160F RVW MEDS BY RX/DR IN RCRD: CPT | Mod: CPTII,S$GLB,, | Performed by: INTERNAL MEDICINE

## 2024-05-17 PROCEDURE — 3008F BODY MASS INDEX DOCD: CPT | Mod: CPTII,S$GLB,, | Performed by: INTERNAL MEDICINE

## 2024-05-17 PROCEDURE — 99999 PR PBB SHADOW E&M-EST. PATIENT-LVL IV: CPT | Mod: PBBFAC,,, | Performed by: INTERNAL MEDICINE

## 2024-05-17 PROCEDURE — 80061 LIPID PANEL: CPT | Performed by: INTERNAL MEDICINE

## 2024-05-17 NOTE — PROGRESS NOTES
Subjective:       Patient ID: Annie Herrera is a 63 y.o. female.    Chief Complaint:   Annual Exam    HPI - She's doing well.  Here for her annual.  She had shingles diagnosis 3 months ago, and still has some waning neuropathic pain in her right arm/back.  Due for labs and mammogram.  She's not a smoker.  Doesn't drink alcohol and is sexually active with  only.    PMH:  Obesity  Pre-diabetes  Palpitations   Elevated cholesterol, not on a statin     Meds:  Reviewed and reconciled in EPIC with patient during visit today.     Review of Systems   Constitutional:  Negative for fever.   HENT:  Negative for congestion.    Respiratory:  Negative for shortness of breath.    Cardiovascular:  Negative for chest pain.   Gastrointestinal:  Negative for abdominal pain.   Genitourinary:  Negative for difficulty urinating.   Musculoskeletal:  Negative for arthralgias.   Skin:  Negative for rash.   Neurological:  Negative for headaches.   Psychiatric/Behavioral:  Negative for sleep disturbance.        Objective:      Physical Exam  Vitals reviewed.   Constitutional:       Appearance: She is well-developed.   HENT:      Head: Normocephalic and atraumatic.   Cardiovascular:      Rate and Rhythm: Normal rate and regular rhythm.      Heart sounds: Normal heart sounds. No murmur heard.     No friction rub. No gallop.   Pulmonary:      Effort: Pulmonary effort is normal. No respiratory distress.      Breath sounds: Normal breath sounds. No wheezing or rales.   Chest:      Chest wall: No tenderness.   Skin:     General: Skin is warm and dry.      Findings: No erythema.   Neurological:      General: No focal deficit present.      Mental Status: She is alert.   Psychiatric:         Mood and Affect: Mood normal.         Assessment:       1. Routine history and physical examination of adult    2. Encounter for screening mammogram for malignant neoplasm of breast    3. History of shingles        Plan:       Annie was seen today for  annual exam.    Diagnoses and all orders for this visit:    Routine history and physical examination of adult - doing well.  Updating labs.  Discussed weight loss  -     Comprehensive metabolic panel; Future  -     Lipid panel; Future    Encounter for screening mammogram for malignant neoplasm of breast - due.  Ordered.  -     Mammo Digital Screening Bilat; Future    History of shingles - discussed etiology of post-herpetic neuralgia and uncertain duration.  Monitor for now.  Consider nerve block if not better in a couple months    Rtc prn    LUNA Paige MD MPH  Staff Internist

## 2024-05-24 ENCOUNTER — HOSPITAL ENCOUNTER (OUTPATIENT)
Dept: RADIOLOGY | Facility: HOSPITAL | Age: 64
Discharge: HOME | End: 2024-05-24
Attending: INTERNAL MEDICINE
Payer: COMMERCIAL

## 2024-05-24 VITALS — HEIGHT: 66 IN | WEIGHT: 230 LBS | BODY MASS INDEX: 36.96 KG/M2

## 2024-05-24 DIAGNOSIS — Z12.31 ENCOUNTER FOR SCREENING MAMMOGRAM FOR MALIGNANT NEOPLASM OF BREAST: ICD-10-CM

## 2024-05-24 PROCEDURE — 77067 SCR MAMMO BI INCL CAD: CPT | Mod: 26,,, | Performed by: RADIOLOGY

## 2024-05-24 PROCEDURE — 77063 BREAST TOMOSYNTHESIS BI: CPT | Mod: 26,,, | Performed by: RADIOLOGY

## 2024-05-24 PROCEDURE — 77067 SCR MAMMO BI INCL CAD: CPT | Mod: TC

## 2024-06-10 ENCOUNTER — PATIENT MESSAGE (OUTPATIENT)
Dept: INTERNAL MEDICINE | Facility: CLINIC | Age: 64
End: 2024-06-10
Payer: COMMERCIAL

## 2024-10-28 ENCOUNTER — IMMUNIZATION (OUTPATIENT)
Dept: INTERNAL MEDICINE | Facility: CLINIC | Age: 64
End: 2024-10-28
Payer: COMMERCIAL

## 2024-10-28 DIAGNOSIS — Z23 NEED FOR VACCINATION: Primary | ICD-10-CM

## 2024-10-28 PROCEDURE — 90471 IMMUNIZATION ADMIN: CPT | Mod: S$GLB,,, | Performed by: INTERNAL MEDICINE

## 2024-10-28 PROCEDURE — 90656 IIV3 VACC NO PRSV 0.5 ML IM: CPT | Mod: S$GLB,,, | Performed by: INTERNAL MEDICINE

## 2025-06-04 DIAGNOSIS — Z12.31 OTHER SCREENING MAMMOGRAM: ICD-10-CM

## 2025-06-10 ENCOUNTER — OFFICE VISIT (OUTPATIENT)
Dept: INTERNAL MEDICINE | Facility: CLINIC | Age: 65
End: 2025-06-10
Payer: COMMERCIAL

## 2025-06-10 ENCOUNTER — LAB VISIT (OUTPATIENT)
Dept: LAB | Facility: HOSPITAL | Age: 65
End: 2025-06-10
Payer: COMMERCIAL

## 2025-06-10 VITALS
BODY MASS INDEX: 36.63 KG/M2 | DIASTOLIC BLOOD PRESSURE: 76 MMHG | WEIGHT: 227.94 LBS | HEART RATE: 81 BPM | OXYGEN SATURATION: 96 % | HEIGHT: 66 IN | SYSTOLIC BLOOD PRESSURE: 122 MMHG

## 2025-06-10 DIAGNOSIS — R73.03 PREDIABETES: ICD-10-CM

## 2025-06-10 DIAGNOSIS — E66.9 OBESITY (BMI 35.0-39.9 WITHOUT COMORBIDITY): ICD-10-CM

## 2025-06-10 DIAGNOSIS — Z12.31 ENCOUNTER FOR SCREENING MAMMOGRAM FOR MALIGNANT NEOPLASM OF BREAST: ICD-10-CM

## 2025-06-10 DIAGNOSIS — E78.5 HYPERLIPIDEMIA, UNSPECIFIED HYPERLIPIDEMIA TYPE: ICD-10-CM

## 2025-06-10 DIAGNOSIS — Z00.00 ANNUAL PHYSICAL EXAM: Primary | ICD-10-CM

## 2025-06-10 DIAGNOSIS — Z00.00 ANNUAL PHYSICAL EXAM: ICD-10-CM

## 2025-06-10 LAB
ABSOLUTE EOSINOPHIL (OHS): 0.17 K/UL
ABSOLUTE MONOCYTE (OHS): 0.33 K/UL (ref 0.3–1)
ABSOLUTE NEUTROPHIL COUNT (OHS): 3.7 K/UL (ref 1.8–7.7)
ALBUMIN SERPL BCP-MCNC: 4.1 G/DL (ref 3.5–5.2)
ALP SERPL-CCNC: 72 UNIT/L (ref 40–150)
ALT SERPL W/O P-5'-P-CCNC: 17 UNIT/L (ref 10–44)
ANION GAP (OHS): 7 MMOL/L (ref 8–16)
AST SERPL-CCNC: 19 UNIT/L (ref 11–45)
BASOPHILS # BLD AUTO: 0.04 K/UL
BASOPHILS NFR BLD AUTO: 0.7 %
BILIRUB SERPL-MCNC: 0.4 MG/DL (ref 0.1–1)
BUN SERPL-MCNC: 12 MG/DL (ref 8–23)
CALCIUM SERPL-MCNC: 9.3 MG/DL (ref 8.7–10.5)
CHLORIDE SERPL-SCNC: 108 MMOL/L (ref 95–110)
CHOLEST SERPL-MCNC: 200 MG/DL (ref 120–199)
CHOLEST/HDLC SERPL: 4.1 {RATIO} (ref 2–5)
CO2 SERPL-SCNC: 26 MMOL/L (ref 23–29)
CREAT SERPL-MCNC: 0.7 MG/DL (ref 0.5–1.4)
EAG (OHS): 114 MG/DL (ref 68–131)
ERYTHROCYTE [DISTWIDTH] IN BLOOD BY AUTOMATED COUNT: 13 % (ref 11.5–14.5)
GFR SERPLBLD CREATININE-BSD FMLA CKD-EPI: >60 ML/MIN/1.73/M2
GLUCOSE SERPL-MCNC: 94 MG/DL (ref 70–110)
HBA1C MFR BLD: 5.6 % (ref 4–5.6)
HCT VFR BLD AUTO: 45.8 % (ref 37–48.5)
HDLC SERPL-MCNC: 49 MG/DL (ref 40–75)
HDLC SERPL: 24.5 % (ref 20–50)
HGB BLD-MCNC: 14.2 GM/DL (ref 12–16)
IMM GRANULOCYTES # BLD AUTO: 0.03 K/UL (ref 0–0.04)
IMM GRANULOCYTES NFR BLD AUTO: 0.5 % (ref 0–0.5)
LDLC SERPL CALC-MCNC: 122.6 MG/DL (ref 63–159)
LYMPHOCYTES # BLD AUTO: 1.49 K/UL (ref 1–4.8)
MCH RBC QN AUTO: 29.2 PG (ref 27–31)
MCHC RBC AUTO-ENTMCNC: 31 G/DL (ref 32–36)
MCV RBC AUTO: 94 FL (ref 82–98)
NONHDLC SERPL-MCNC: 151 MG/DL
NUCLEATED RBC (/100WBC) (OHS): 0 /100 WBC
PLATELET # BLD AUTO: 342 K/UL (ref 150–450)
PMV BLD AUTO: 10.3 FL (ref 9.2–12.9)
POTASSIUM SERPL-SCNC: 4.5 MMOL/L (ref 3.5–5.1)
PROT SERPL-MCNC: 7.1 GM/DL (ref 6–8.4)
RBC # BLD AUTO: 4.86 M/UL (ref 4–5.4)
RELATIVE EOSINOPHIL (OHS): 3 %
RELATIVE LYMPHOCYTE (OHS): 25.9 % (ref 18–48)
RELATIVE MONOCYTE (OHS): 5.7 % (ref 4–15)
RELATIVE NEUTROPHIL (OHS): 64.2 % (ref 38–73)
SODIUM SERPL-SCNC: 141 MMOL/L (ref 136–145)
TRIGL SERPL-MCNC: 142 MG/DL (ref 30–150)
TSH SERPL-ACNC: 1.8 UIU/ML (ref 0.4–4)
WBC # BLD AUTO: 5.76 K/UL (ref 3.9–12.7)

## 2025-06-10 PROCEDURE — 3074F SYST BP LT 130 MM HG: CPT | Mod: CPTII,S$GLB,, | Performed by: NURSE PRACTITIONER

## 2025-06-10 PROCEDURE — 3008F BODY MASS INDEX DOCD: CPT | Mod: CPTII,S$GLB,, | Performed by: NURSE PRACTITIONER

## 2025-06-10 PROCEDURE — 1159F MED LIST DOCD IN RCRD: CPT | Mod: CPTII,S$GLB,, | Performed by: NURSE PRACTITIONER

## 2025-06-10 PROCEDURE — 84443 ASSAY THYROID STIM HORMONE: CPT

## 2025-06-10 PROCEDURE — 36415 COLL VENOUS BLD VENIPUNCTURE: CPT

## 2025-06-10 PROCEDURE — 83036 HEMOGLOBIN GLYCOSYLATED A1C: CPT

## 2025-06-10 PROCEDURE — 85025 COMPLETE CBC W/AUTO DIFF WBC: CPT

## 2025-06-10 PROCEDURE — 80061 LIPID PANEL: CPT

## 2025-06-10 PROCEDURE — 99999 PR PBB SHADOW E&M-EST. PATIENT-LVL IV: CPT | Mod: PBBFAC,,, | Performed by: NURSE PRACTITIONER

## 2025-06-10 PROCEDURE — 3078F DIAST BP <80 MM HG: CPT | Mod: CPTII,S$GLB,, | Performed by: NURSE PRACTITIONER

## 2025-06-10 PROCEDURE — 99396 PREV VISIT EST AGE 40-64: CPT | Mod: S$GLB,,, | Performed by: NURSE PRACTITIONER

## 2025-06-10 PROCEDURE — 80053 COMPREHEN METABOLIC PANEL: CPT

## 2025-06-10 NOTE — PROGRESS NOTES
"Subjective     Patient ID: Annie Herrera is a 64 y.o. female.  /76 (BP Location: Right arm, Patient Position: Sitting)   Pulse 81   Ht 5' 6" (1.676 m)   Wt 103.4 kg (227 lb 15.3 oz)   SpO2 96%   BMI 36.79 kg/m²      Chief Complaint: Annual Exam    She's doing well.  Here for her annual.  Due for labs and mammogram.  She's not a smoker.  Doesn't drink alcohol and is sexually active with  only.  Walks 4 times a week.  Motivated to loose weight through dietary and lifestyle modifications.      PMH:  Obesity  Pre-diabetes  Palpitations   Elevated cholesterol, not on a statin     Meds:  Reviewed and reconciled in EPIC with patient during visit today.          Problem List[1]     Current Medications[2]     Review of Systems   All other systems reviewed and are negative.         Objective     Physical Exam  Constitutional:       General: She is not in acute distress.     Appearance: Normal appearance. She is obese. She is not ill-appearing, toxic-appearing or diaphoretic.   HENT:      Head: Normocephalic and atraumatic.   Cardiovascular:      Rate and Rhythm: Normal rate and regular rhythm.      Heart sounds: Normal heart sounds.   Pulmonary:      Effort: Pulmonary effort is normal.      Breath sounds: Normal breath sounds.   Abdominal:      Palpations: Abdomen is soft.   Musculoskeletal:      Cervical back: Neck supple.   Skin:     General: Skin is warm and dry.   Neurological:      Mental Status: She is alert and oriented to person, place, and time.   Psychiatric:         Mood and Affect: Mood normal.         Behavior: Behavior normal.          Assessment and Plan     1. Annual physical exam; labs and screenings ordered per USPTF guidelines. Will follow up whit result when available.   -     Mammo Digital Screening Bilat w/ Yunier (XPD); Future; Expected date: 06/10/2025  -     HEMOGLOBIN A1C; Future; Expected date: 06/10/2025  -     TSH; Future; Expected date: 06/10/2025  -     Comprehensive Metabolic " Panel; Future; Expected date: 06/10/2025  -     CBC W/ AUTO DIFFERENTIAL; Future; Expected date: 06/10/2025    2. Hyperlipidemia, unspecified hyperlipidemia type;  last year, not on statin. Labs today.   -     LIPID PANEL; Future; Expected date: 06/10/2025    3. Obesity (BMI 35.0-39.9 without comorbidity); down 3 pounds since last year. Discussed dietary and lifestyle modifications aimed at weight reduction. Provided handouts on Mediterranean diet. Goal of 150min of moderate intensity exercise per week / 30min 5 times per week.    -     Ambulatory Referral/Consult to Lifestyle Nutrition; Future; Expected date: 06/17/2025    4. Encounter for screening mammogram for malignant neoplasm of breast; due for yearly screening. Ordered.   -     Mammo Digital Screening Bilat w/ Yunier (XPD); Future; Expected date: 06/10/2025    5. Prediabetes; repeat A1C today. Follow up with labs when available.   -     HEMOGLOBIN A1C; Future; Expected date: 06/10/2025          Wili Vega NP   Internal Medicine           Follow up in about 1 year (around 6/10/2026) for Annual.         [1]   Patient Active Problem List  Diagnosis    Obesity (BMI 35.0-39.9 without comorbidity)    History of shingles    Palpitations    Prediabetes    Hyperlipidemia   [2]   Current Outpatient Medications   Medication Sig Dispense Refill    calcium carbonate (OS-CARL) 500 mg calcium (1,250 mg) chewable tablet Take 1 tablet by mouth once daily.      flaxseed 1,000 mg Cap Take by mouth.       No current facility-administered medications for this visit.

## 2025-06-11 ENCOUNTER — HOSPITAL ENCOUNTER (OUTPATIENT)
Dept: RADIOLOGY | Facility: HOSPITAL | Age: 65
Discharge: HOME | End: 2025-06-11
Attending: NURSE PRACTITIONER
Payer: COMMERCIAL

## 2025-06-11 VITALS — BODY MASS INDEX: 36.48 KG/M2 | HEIGHT: 66 IN | WEIGHT: 227 LBS

## 2025-06-11 DIAGNOSIS — Z00.00 ANNUAL PHYSICAL EXAM: ICD-10-CM

## 2025-06-11 DIAGNOSIS — Z12.31 ENCOUNTER FOR SCREENING MAMMOGRAM FOR MALIGNANT NEOPLASM OF BREAST: ICD-10-CM

## 2025-06-11 PROCEDURE — 77063 BREAST TOMOSYNTHESIS BI: CPT | Mod: 26,,, | Performed by: RADIOLOGY

## 2025-06-11 PROCEDURE — 77067 SCR MAMMO BI INCL CAD: CPT | Mod: 26,,, | Performed by: RADIOLOGY

## 2025-06-11 PROCEDURE — 77063 BREAST TOMOSYNTHESIS BI: CPT | Mod: TC

## 2025-06-17 ENCOUNTER — PATIENT MESSAGE (OUTPATIENT)
Dept: INTERNAL MEDICINE | Facility: CLINIC | Age: 65
End: 2025-06-17
Payer: COMMERCIAL

## 2025-06-19 ENCOUNTER — PATIENT MESSAGE (OUTPATIENT)
Dept: INTERNAL MEDICINE | Facility: CLINIC | Age: 65
End: 2025-06-19
Payer: COMMERCIAL

## 2025-06-30 ENCOUNTER — NUTRITION (OUTPATIENT)
Dept: NUTRITION | Facility: CLINIC | Age: 65
End: 2025-06-30
Payer: COMMERCIAL

## 2025-06-30 DIAGNOSIS — E66.9 OBESITY (BMI 35.0-39.9 WITHOUT COMORBIDITY): ICD-10-CM

## 2025-06-30 PROCEDURE — 97802 MEDICAL NUTRITION INDIV IN: CPT | Mod: S$GLB,,, | Performed by: NUTRITIONIST

## 2025-06-30 PROCEDURE — 99999 PR PBB SHADOW E&M-EST. PATIENT-LVL II: CPT | Mod: PBBFAC,,, | Performed by: NUTRITIONIST

## 2025-06-30 NOTE — PATIENT INSTRUCTIONS
"    Name: Annie Herrera  Date: 06/30/2025    Nutrition protocol    Action Items/Goals:  - "Jay # 7 Rule": Guidelines for food brands found in aisles of grocery stores; Look for brands that contain < 7 grams added sugar and  > 7 grams protein   - Deep fried foods: Enjoy at most twice a month  - Weight bearing exercises: At some point if we can add 1-2 days per week, that would be great for metabolism purposes)  -Eat (or drink protein) every 3-4 hours; Setting alarms on your phone may help iniitally as a reminder  Food is fuel for your body  Helps boost metabolism  Helps preserve lean muscle  Helps with portion control  -Focus on understanding at most how many carbohydrate servings are recommend for each meal and snack with dinner being a bit lower in carbohydrates. The remainder of your plate will consist of lean protein and non-starchy veggies  -Portion Control:   -Measure and/or weigh your recommended cooked portions when starting your meal plan. See what each portion looks like on your plate, then eyeball portions going forward  -Increase water intake  -Decrease diet coke consumption. Try Zevia's  -Sweet tooth: Anything up to 150 calories/day  -Even 100% fruit juice contains the same sugar as a soft drink. Try Top 50 OJ  -Increase intake of non-starchy veggies  -Try red lentil pasta & Banza chickpea pasta. You can even mix in 1/2 cup cooked regular salvador hair pasta with 1/2 cup cooked lentil pasta  -For you : Fairlife chocolate milk      Daily Energy Requirements:  Calories: 1,800 - 1,900 kcal  Protein: ~140 grams   Carbohydrates:  ~140 grams   Total Fats: 80 grams; focus on plant-based healthy fats  Fluid: 110 fluid ounces + sweat loss or MD recommendations  Limit Added Sugar to no more than 20 grams    Meal Plan Template:      Lunch: 6-7 ounces protein (42-49 grams) + up to 3 servings (40-60 grams) of carbohydrates + heart healthy fats + unlimited non-starchy vegetables    -1 cup cooked salvador hair " "pasta + 6 oz 90/10 meat + low sugar red sauce + side of non-starchy veggies + piece of fruit    -I recommend trying red lentil pasta & Banza chickpea pasta   -6" roast beef poboy with 6 oz roast beef + side of non-starchy veggies   -Chicken salad: 6 oz chicken made with 2 tablespoons regular hardin. You can also mix in plain Greek yogurt to increase protein content without altering taste. Serve over salad -or- on 2 slices Mike's Killer 21 whole grains and seeds bread + side of non-starchy veggies + piece of fruit    -If having over a salad, then you add a piece of fruit   -Red beans: 1 cup cooked red beans + additional 4 oz lean protein (ham, chicken sausage, turkey sausage, etc) + side of non-starchy veggies    Snack: At least 3 ounces protein (20+ grams) + 1-1.5 servings (15-30 grams) of carbohydrates +  heart healthy fats    -Quick/On-The-Go Option: Flapjacked Mighty Muffin (found on baking aisle of grocery stores)   -2 oz cheese + 1/4 cup nuts    -piece of fruit + 1 tablespoon PB + 2-3 oz leftover lean protein   -Better-for-you ayana's: 1 serving of Siete grain free tortilla chips + 1/3 cup shredded cheese melted on top + 2 oz lean protein (shredded chicken, 90/10 ground beef, etc)  -Mini sandwich: 1 slice 100% whole grain bread + ½ - 1 tablespoon mayonnaise + 3-4 oz lean protein topped with non-starchy veggies of your choice    Dinner: 6-7 ounces of protein (42-49 grams) + 1 servings of carbohydrates (15-20 grams) + heart healthy fats + unlimited non-starchy vegetables   -Start off with 1 serving of carbohydrates = 15-20 grams = 1/2 cup cooked starch or starchy vegetable. If you find this to be too small at first, then we can go up to 2 servings of carbs = 30-40 grams = 1 cup cooked starch/starchy vegetable    -The remainder of your plate will consist of 6-7 oz lean protein + side of non-starchy veggies   -Salad: Unlimited non-starchy veggies + 6 oz lean protein + 2 of the folowing salad fats, each being ~2-3 " tablespoons (dressing, cheese, nuts, 1/2 avocado)   -Restaurants: See tips below, particularly the 'pick 1 out of the 4' rule, and still visualize your recommended portions and put the extra portions of your meal in a to go box to enjoy later  -Fast Food  Convenient Options: Refer to Fueling Well On-The-Go Guide     - Carb Swaps for Dinner:  -Hearts of Palm-Based  Linguini (Ex: Palmini)  -Riced Cauliflower  -Riced Broccoli  -Cauliflower Mash   -Spaghetti Squash  -Zoodles  Beets Zoodles  -Outer Aisle Gourmet: Frozen cauliflower pizza crusts -or- sandwich thins   -Low Carb Bread (Ex: Lashon or Base Culture)        Dining Out   -[facility]   Designed to take the guesswork out of dining out healthfully, [facility] makes the healthy choice the easy choice  Visit [email or web address]   Order the '[place] Cookbook  Revised, Updated, Expanded' to create restaurant quality dishes at home  Craft: The Eat Fit Guide to Zero Proof Cocktails  Download the [facility] Eat [facility] ranjan for free on your smartphone  List of all Eat Fit restaurant partners & dishes by location  Nutrition facts included for all Eat Fit dishes  200 + Eat Fit approved recipes  Eat Fit grocery shopping guides + community wellness resources      Restaurant Tips      Pick 1 out of the 4 Rule: Instead of eating bread/tortilla chips, an appetizer,  alcoholic drink, and dessert, just choose one to have with your entrée   Focus on lean cuts of protein: Refer to lean meat/meat substitutes page in meal planning guidebook  Select items grilled, baked, broiled, braised, poached, or roasted       For heart health, avoid crispy, crunchy, breaded, paneed or stuffed items as well as items that are cream based, au gratin or buttered       Order sauces, dressings, and gravies on the side  This way you can add 1-2 tablespoons yourself, which helps portion control      You can request double non-starchy vegetables instead of a starchy side dish  If the  starchy side is something you love, consider splitting it with someone else     Beverages: Order water with lemon, sparkling water, or unsweetened tea. Avoid sugary beverages such as soft drinks, juices, lemonade, and mixers   Deep fried foods: If desired, enjoy at most twice a month       Refer to Eat Fit Shopping Guide for approved food & beverage brands    Lifestyle Nutrition Department   [phone]  [email or web address]

## 2025-06-30 NOTE — PROGRESS NOTES
"Nutrition Assessment    Visit Type: Insurance initial  Session Time:  1 hour 38 minutes  Reason for MNT visit: Pt in for education and nutrition counseling regarding Prediabetes, HLP, and Obesity.       Age: 64 y.o.  Wt:   Wt Readings from Last 1 Encounters:   25 103 kg (227 lb)      Ht:   Ht Readings from Last 1 Encounters:   25 5' 6" (1.676 m)     BMI:   BMI Readings from Last 1 Encounters:   25 36.64 kg/m²                       Wt Readings from Last 10 Encounters:   25 103 kg (227 lb)   06/10/25 103.4 kg (227 lb 15.3 oz)   24 104.3 kg (230 lb)   24 104.5 kg (230 lb 6.1 oz)   24 102 kg (224 lb 13.9 oz)   23 100.2 kg (221 lb)   23 100.3 kg (221 lb 1.9 oz)   23 95.3 kg (210 lb)   23 95.3 kg (210 lb)   22 102.3 kg (225 lb 8.5 oz)         Client states:  6.30.: She wants to learn how to eat better. She loves cheese, milk and pasta. Her  retired 5 years ago, where her lifestyle changed. Her  eats 3 meals a day and looks forward to each meal where it sometimes makes Annie not always want to eat something or she eats what her  eats and it's not really her go to option. She did Intermittent Fasting 3 years ago because she isn't a breakfast person. She sews at home and loves to iron. She has a 4 month old grandson.      Medical History  Problem List           Resolved    Obesity (BMI 35.0-39.9 without comorbidity)         History of shingles         Palpitations         Prediabetes         Hyperlipidemia            Past Medical History:   Diagnosis Date    Ankle fracture 2016    Hyperlipidemia        Past Surgical History:   Procedure Laterality Date     SECTION, CLASSIC      COLONOSCOPY N/A 3/8/2023    Procedure: COLONOSCOPY;  Surgeon: Laci Shaver MD;  Location: [facility] ([address] [place] [address]);  Service: Endoscopy;  Laterality: N/A;  instructions sent to [facility]  Precalled 3/2/23. Pt verbalized understanding " of procedure instructions [facility]    HYSTERECTOMY      OOPHORECTOMY      TONSILLECTOMY          Lab Reports:  Reviewed and noted     Lab Results   Component Value Date    [id number] 27 (L) 12/19/2016    TSH 1.804 06/10/2025    AST 19 06/10/2025    ALT 17 06/10/2025    HDL 49 06/10/2025    LDLCALC 122.6 06/10/2025    TRIG 142 06/10/2025    HGBA1C 5.6 06/10/2025    HGBA1C 5.4 05/08/2023    HGBA1C 5.7 (H) 04/29/2022      BP Readings from Last 1 Encounters:   06/10/25 122/76        Medications    Prior to Admission medications    Medication Sig Start Date End Date Taking? Authorizing Provider   calcium carbonate (OS-CARL) 500 mg calcium (1,250 mg) chewable tablet Take 1 tablet by mouth once daily.    Provider, Historical   flaxseed 1,000 mg Cap Take by mouth.    Provider, Historical          LIFESTYLE FACTORS    Energy Level: decent when busy throughout the day; lower on days where she is more sedentary    Stress Level: low-moderate    Support System:  spouse    Exercise Regimen: lightly active (low intensity, 1-3 days a week)    Social History    Marital status:      Social History     Tobacco Use    Smoking status: Never    Smokeless tobacco: Never   Substance Use Topics    Alcohol use: No     Current Alcohol use: none      Meal preparation/shopping: Who does the grocery shopping:: (Patient-Rptd) (P) Me Who prepares the meals: (Patient-Rptd) (P) Me      Dinning out: 3-4 x per week    Food Allergies or Intolerances:  Iodine     24-hour Recall: Reviewed and noted during consultation     Wake-up: 7:00 am    Breakfast: none, not a breakfast person      Brunch/Lunch:10:30 -11:00 am: 1 egg, 3 pork sausage links & Aldi's white bread //  Red beans and rice (she is not a rice person though) with a slice of bread and butter // Cane's once every 2-3 weeks // ground dariana burger // chicken salad sandwich // Caffe Caffe soup = red beans and rice and chicken salad sandwich // 1/2 roast beef or fried shrimp poboy from  [employer] (no sides) // chicken salad made with 2 tablespoons regular hardin on lettuce with 1/2 an egg + pickles & olives + 1/2 avocado with no dressing and does olive salad //  smoked sausage on American bread hoagies + 1/2 avocado   -If eats at a restaurant, she does feel stuffed after    Afternoon Snack: 3:00 pm: apple // banana // cantaloupe // slice of cheese with a handful of nuts // slice of bread & 1 tablespoon Jiff PB // spoonful of peanut butter // popcorn    Dinner:6:00 pm: 1 cup salvador hair Spaghetti (80/20) & meat sauce with Prego with zucchini or green peas (in a can) // Breaded pork chops with Kraft 1/2 cup Macaroni & cheese & broccoli // Rotisserie chicken with a big salad    -Eating out = Annie states this is her downfall     -[employer]: Splits a salad + entree       Food choices (After Midnight)  Patient eating midnight to 4am: (Patient-Rptd) (P) Never     Food choices (Early Morning)  Patient eats 4am to 8am: (Patient-Rptd) (P) Never        Food choices (Morning)  Patient eats 8am to noon: (Patient-Rptd) (P) Several times a week   Home cooked meals (8am - noon): (Patient-Rptd) (P) Eggs & sausage, toast, oatmeal, fruit   Fast food meals (8am - midnight): (Patient-Rptd) (P) Croissant   Snacks (8am - noon): (Patient-Rptd) (P) None     Food choices (Afternoon)  Patient eats noon to 4pm: (Patient-Rptd) (P) Several times a week   Home cooked meals (Noon - 4pm): (Patient-Rptd) (P) Beans and rice, salads or sandwich   Snacks (Noon to 4pm): (Patient-Rptd) (P) None     Food choices (Evening)   Patient eats 4pm to 8pm: (Patient-Rptd) (P) Every day   Home cooked meals (4pm - 8pm): (Patient-Rptd) (P) Pasta and meat sauce, breaded meats, salads, grilled burgers, pan fried fish, chicken   Fast food meals (4pm - 8pm): (Patient-Rptd) (P) Canes   Snacks (4pm - 8pm): (Patient-Rptd) (P) Brownies, cookies     Food choices (Late evening)  R [facility] PEQ NUTRITION HOW OFTEN EATING LATE EVENING: (Patient-Rptd) (P)  Once or twice a month   Home cooked meals (8pm - midnight): (Patient-Rptd) (P) Beans and rice, spaghetti and meatballs, grilled cheese, egg salad, tuna salad   Fast food meals (8pm - midnight): (Patient-Rptd) (P) Poboys, fried chicken   Snacks (8pm - midnight: (Patient-Rptd) (P) Cookies, fruit      Beverages:  How much water consumed (per day?): (Patient-Rptd) (P) 4   Cups of milk consumed (per day?): (Patient-Rptd) (P) 1   Types of milk: (Patient-Rptd) (P) 2% milk, [place] milk   Cups of  juice consumed (per day?): (Patient-Rptd) (P) 1 6 oz OJ every morning  Number of supplement shakes (per day?): (Patient-Rptd) (P) 0       Number of cups of coffee (per day?): (Patient-Rptd) (P) 0           Cups of soda consumed (per day?): (Patient-Rptd) (P) 1   Other non-alcoholic beverages consumed (per day?): (Patient-Rptd) (P) 0              Diagnosis    Excessive energy intake related to eating too many calories from restaurants as evidenced by 24 hour recall.      Intervention    Estimated Energy Requirements:   Calories: 1,800 - 1,900 kcal  Protein: ~140 grams   Carbohydrates:  ~140 grams   Total Fats: 80 grams; focus on plant-based healthy fats  Fluid: 110 fluid ounces + sweat loss or MD recommendations  Limit Added Sugar to no more than 20 grams    Recommendations & Goals:  Patient goals and recommendations are tailored to the specific patient's needs, readiness to change, lifestyle, culture, skills, resources, & abilities. Strategies to help achieve these nutrition-related goals were discussed which can include but are not limited to SMART goal setting & mindful eating.     Aim for a minimum of 7 hours sleep   Exercise 60 minutes most days  Eat breakfast within 1-2 hours of waking up  Try not to skip any meals or snacks, not going more than 3-4 hours without eating   At each meal and snack, try to include a source of fiber + lean protein + healthy fat     Written Materials Provided  These resources are intended to assist  the patient in making it easier to choose recommended options when eating out & to identify better-for-you brands at the grocery store:    Meal Planning Guide with recommendations discussed along with portion sizes and a meal plan   Fueling Well On-the-Go Food Guide  Eat Fit Shopping Guide  Lifestyle Nutrition Meal Guide  RD contact information    Action Items:   - Jay #7 Rule: Guidelines for food brands found in aisles of grocery stores; Look for brands that contain < 7 grams added sugar and  > 7 grams protein   - Deep fried foods: Enjoy at most twice a month  - Weight bearing exercises: At some point if we can add 1-2 days per week, that would be great for metabolism purposes)  -Eat (or drink protein) every 3-4 hours; Setting alarms on your phone may help iniitally as a reminder  Food is fuel for your body  Helps boost metabolism  Helps preserve lean muscle  Helps with portion control  -Focus on understanding at most how many carbohydrate servings are recommend for each meal and snack with dinner being a bit lower in carbohydrates. The remainder of your plate will consist of lean protein and non-starchy veggies  -Portion Control:   -Measure and/or weigh your recommended cooked portions when starting your meal plan. See what each portion looks like on your plate, then eyeball portions going forward  -Increase water intake  -Decrease diet coke consumption. Try Zevia's  -Sweet tooth: Anything up to 150 calories/day  -Even 100% fruit juice contains the same sugar as a soft drink. Try Top 50 OJ  -Increase intake of non-starchy veggies  -Try red lentil pasta & Banza chickpea pasta. You can even mix in 1/2 cup cooked regular salvador hair pasta with 1/2 cup cooked lentil pasta  -For you : [employer] chocolate milk        Monitoring/Evaluation    Monitor the following:  Weight  Sleep  Stress Management  Movement  Nutrient intake in reference to meal plan    Communicated with healthcare provider and documented plan  for referral to appropriate agency/healthcare provider as needed    Supervising Physician: Shankar Lyons MD    Patient motivation, anticipated barriers, expected compliance: Patient is motivated and has verbalized understanding and intent to comply.     Comprehension: good     Follow-up: 6-8 weeks